# Patient Record
Sex: FEMALE | Race: BLACK OR AFRICAN AMERICAN | NOT HISPANIC OR LATINO | Employment: OTHER | URBAN - METROPOLITAN AREA
[De-identification: names, ages, dates, MRNs, and addresses within clinical notes are randomized per-mention and may not be internally consistent; named-entity substitution may affect disease eponyms.]

---

## 2023-06-29 LAB
CREATININE (MG/DL) IN SER/PLAS: 0.87 MG/DL (ref 0.5–1.05)
GFR FEMALE: 69 ML/MIN/1.73M2

## 2023-08-17 LAB
ALANINE AMINOTRANSFERASE (SGPT) (U/L) IN SER/PLAS: 18 U/L (ref 7–45)
ALBUMIN (G/DL) IN SER/PLAS: 4.2 G/DL (ref 3.4–5)
ALKALINE PHOSPHATASE (U/L) IN SER/PLAS: 66 U/L (ref 33–136)
ANION GAP IN SER/PLAS: 13 MMOL/L (ref 10–20)
ASPARTATE AMINOTRANSFERASE (SGOT) (U/L) IN SER/PLAS: 17 U/L (ref 9–39)
BILIRUBIN TOTAL (MG/DL) IN SER/PLAS: 0.2 MG/DL (ref 0–1.2)
CALCIDIOL (25 OH VITAMIN D3) (NG/ML) IN SER/PLAS: 27 NG/ML
CALCIUM (MG/DL) IN SER/PLAS: 9.2 MG/DL (ref 8.6–10.6)
CARBON DIOXIDE, TOTAL (MMOL/L) IN SER/PLAS: 27 MMOL/L (ref 21–32)
CHLORIDE (MMOL/L) IN SER/PLAS: 108 MMOL/L (ref 98–107)
CREATININE (MG/DL) IN SER/PLAS: 0.95 MG/DL (ref 0.5–1.05)
ERYTHROCYTE DISTRIBUTION WIDTH (RATIO) BY AUTOMATED COUNT: 18.8 % (ref 11.5–14.5)
ERYTHROCYTE MEAN CORPUSCULAR HEMOGLOBIN CONCENTRATION (G/DL) BY AUTOMATED: 28.8 G/DL (ref 32–36)
ERYTHROCYTE MEAN CORPUSCULAR VOLUME (FL) BY AUTOMATED COUNT: 77 FL (ref 80–100)
ERYTHROCYTES (10*6/UL) IN BLOOD BY AUTOMATED COUNT: 4.44 X10E12/L (ref 4–5.2)
ESTIMATED AVERAGE GLUCOSE FOR HBA1C: 117 MG/DL
GFR FEMALE: 62 ML/MIN/1.73M2
GLUCOSE (MG/DL) IN SER/PLAS: 85 MG/DL (ref 74–99)
HEMATOCRIT (%) IN BLOOD BY AUTOMATED COUNT: 34 % (ref 36–46)
HEMOGLOBIN (G/DL) IN BLOOD: 9.8 G/DL (ref 12–16)
HEMOGLOBIN A1C/HEMOGLOBIN TOTAL IN BLOOD: 5.7 %
LEUKOCYTES (10*3/UL) IN BLOOD BY AUTOMATED COUNT: 4.7 X10E9/L (ref 4.4–11.3)
NRBC (PER 100 WBCS) BY AUTOMATED COUNT: 0 /100 WBC (ref 0–0)
PLATELETS (10*3/UL) IN BLOOD AUTOMATED COUNT: 275 X10E9/L (ref 150–450)
POTASSIUM (MMOL/L) IN SER/PLAS: 5 MMOL/L (ref 3.5–5.3)
PROTEIN TOTAL: 7.1 G/DL (ref 6.4–8.2)
SODIUM (MMOL/L) IN SER/PLAS: 143 MMOL/L (ref 136–145)
THYROTROPIN (MIU/L) IN SER/PLAS BY DETECTION LIMIT <= 0.05 MIU/L: 1.74 MIU/L (ref 0.44–3.98)
UREA NITROGEN (MG/DL) IN SER/PLAS: 23 MG/DL (ref 6–23)

## 2023-12-07 ENCOUNTER — HOSPITAL ENCOUNTER (OUTPATIENT)
Dept: RADIOLOGY | Facility: HOSPITAL | Age: 78
Discharge: HOME | End: 2023-12-07
Payer: COMMERCIAL

## 2023-12-07 DIAGNOSIS — M81.0 AGE-RELATED OSTEOPOROSIS WITHOUT CURRENT PATHOLOGICAL FRACTURE: ICD-10-CM

## 2023-12-07 PROCEDURE — 77080 DXA BONE DENSITY AXIAL: CPT

## 2023-12-07 PROCEDURE — 77080 DXA BONE DENSITY AXIAL: CPT | Performed by: RADIOLOGY

## 2024-02-08 ENCOUNTER — HOSPITAL ENCOUNTER (OUTPATIENT)
Facility: HOSPITAL | Age: 79
Setting detail: OBSERVATION
Discharge: HOME | End: 2024-02-09
Attending: STUDENT IN AN ORGANIZED HEALTH CARE EDUCATION/TRAINING PROGRAM
Payer: COMMERCIAL

## 2024-02-08 ENCOUNTER — APPOINTMENT (OUTPATIENT)
Dept: RADIOLOGY | Facility: HOSPITAL | Age: 79
End: 2024-02-08
Payer: COMMERCIAL

## 2024-02-08 ENCOUNTER — CLINICAL SUPPORT (OUTPATIENT)
Dept: EMERGENCY MEDICINE | Facility: HOSPITAL | Age: 79
End: 2024-02-08
Payer: COMMERCIAL

## 2024-02-08 DIAGNOSIS — R07.9 CHEST PAIN, UNSPECIFIED TYPE: Primary | ICD-10-CM

## 2024-02-08 LAB
ALBUMIN SERPL BCP-MCNC: 4 G/DL (ref 3.4–5)
ALP SERPL-CCNC: 66 U/L (ref 33–136)
ALT SERPL W P-5'-P-CCNC: 14 U/L (ref 7–45)
ANION GAP SERPL CALC-SCNC: 12 MMOL/L (ref 10–20)
AST SERPL W P-5'-P-CCNC: 19 U/L (ref 9–39)
ATRIAL RATE: 84 BPM
BASOPHILS # BLD AUTO: 0.02 X10*3/UL (ref 0–0.1)
BASOPHILS NFR BLD AUTO: 0.5 %
BILIRUB SERPL-MCNC: 0.2 MG/DL (ref 0–1.2)
BUN SERPL-MCNC: 12 MG/DL (ref 6–23)
CALCIUM SERPL-MCNC: 9.5 MG/DL (ref 8.6–10.6)
CARDIAC TROPONIN I PNL SERPL HS: 3 NG/L (ref 0–34)
CARDIAC TROPONIN I PNL SERPL HS: 5 NG/L (ref 0–34)
CHLORIDE SERPL-SCNC: 107 MMOL/L (ref 98–107)
CO2 SERPL-SCNC: 26 MMOL/L (ref 21–32)
CREAT SERPL-MCNC: 0.86 MG/DL (ref 0.5–1.05)
EGFRCR SERPLBLD CKD-EPI 2021: 70 ML/MIN/1.73M*2
EOSINOPHIL # BLD AUTO: 0.05 X10*3/UL (ref 0–0.4)
EOSINOPHIL NFR BLD AUTO: 1.2 %
ERYTHROCYTE [DISTWIDTH] IN BLOOD BY AUTOMATED COUNT: 18.4 % (ref 11.5–14.5)
FLUAV RNA RESP QL NAA+PROBE: NOT DETECTED
FLUBV RNA RESP QL NAA+PROBE: NOT DETECTED
GLUCOSE SERPL-MCNC: 58 MG/DL (ref 74–99)
HCT VFR BLD AUTO: 31.5 % (ref 36–46)
HGB BLD-MCNC: 9.6 G/DL (ref 12–16)
IMM GRANULOCYTES # BLD AUTO: 0.01 X10*3/UL (ref 0–0.5)
IMM GRANULOCYTES NFR BLD AUTO: 0.2 % (ref 0–0.9)
LYMPHOCYTES # BLD AUTO: 1.78 X10*3/UL (ref 0.8–3)
LYMPHOCYTES NFR BLD AUTO: 41.6 %
MCH RBC QN AUTO: 22 PG (ref 26–34)
MCHC RBC AUTO-ENTMCNC: 30.5 G/DL (ref 32–36)
MCV RBC AUTO: 72 FL (ref 80–100)
MONOCYTES # BLD AUTO: 0.42 X10*3/UL (ref 0.05–0.8)
MONOCYTES NFR BLD AUTO: 9.8 %
NEUTROPHILS # BLD AUTO: 2 X10*3/UL (ref 1.6–5.5)
NEUTROPHILS NFR BLD AUTO: 46.7 %
NRBC BLD-RTO: 0 /100 WBCS (ref 0–0)
P AXIS: 80 DEGREES
P OFFSET: 202 MS
P ONSET: 140 MS
PLATELET # BLD AUTO: 295 X10*3/UL (ref 150–450)
POTASSIUM SERPL-SCNC: 4.2 MMOL/L (ref 3.5–5.3)
PR INTERVAL: 166 MS
PROT SERPL-MCNC: 7.7 G/DL (ref 6.4–8.2)
Q ONSET: 223 MS
QRS COUNT: 14 BEATS
QRS DURATION: 82 MS
QT INTERVAL: 350 MS
QTC CALCULATION(BAZETT): 413 MS
QTC FREDERICIA: 391 MS
R AXIS: 72 DEGREES
RBC # BLD AUTO: 4.37 X10*6/UL (ref 4–5.2)
SARS-COV-2 RNA RESP QL NAA+PROBE: NOT DETECTED
SODIUM SERPL-SCNC: 141 MMOL/L (ref 136–145)
T AXIS: 60 DEGREES
T OFFSET: 398 MS
VENTRICULAR RATE: 84 BPM
WBC # BLD AUTO: 4.3 X10*3/UL (ref 4.4–11.3)

## 2024-02-08 PROCEDURE — 84484 ASSAY OF TROPONIN QUANT: CPT | Performed by: EMERGENCY MEDICINE

## 2024-02-08 PROCEDURE — 71046 X-RAY EXAM CHEST 2 VIEWS: CPT

## 2024-02-08 PROCEDURE — 71275 CT ANGIOGRAPHY CHEST: CPT | Performed by: RADIOLOGY

## 2024-02-08 PROCEDURE — 84484 ASSAY OF TROPONIN QUANT: CPT

## 2024-02-08 PROCEDURE — 2550000001 HC RX 255 CONTRASTS: Mod: SE

## 2024-02-08 PROCEDURE — 71045 X-RAY EXAM CHEST 1 VIEW: CPT | Performed by: RADIOLOGY

## 2024-02-08 PROCEDURE — 93005 ELECTROCARDIOGRAM TRACING: CPT | Mod: 59

## 2024-02-08 PROCEDURE — G0378 HOSPITAL OBSERVATION PER HR: HCPCS

## 2024-02-08 PROCEDURE — 80053 COMPREHEN METABOLIC PANEL: CPT | Performed by: STUDENT IN AN ORGANIZED HEALTH CARE EDUCATION/TRAINING PROGRAM

## 2024-02-08 PROCEDURE — 85025 COMPLETE CBC W/AUTO DIFF WBC: CPT | Performed by: EMERGENCY MEDICINE

## 2024-02-08 PROCEDURE — 96374 THER/PROPH/DIAG INJ IV PUSH: CPT | Mod: 59

## 2024-02-08 PROCEDURE — 80053 COMPREHEN METABOLIC PANEL: CPT | Performed by: EMERGENCY MEDICINE

## 2024-02-08 PROCEDURE — 36415 COLL VENOUS BLD VENIPUNCTURE: CPT

## 2024-02-08 PROCEDURE — 85025 COMPLETE CBC W/AUTO DIFF WBC: CPT | Performed by: STUDENT IN AN ORGANIZED HEALTH CARE EDUCATION/TRAINING PROGRAM

## 2024-02-08 PROCEDURE — 96375 TX/PRO/DX INJ NEW DRUG ADDON: CPT | Mod: 59

## 2024-02-08 PROCEDURE — 2500000004 HC RX 250 GENERAL PHARMACY W/ HCPCS (ALT 636 FOR OP/ED): Mod: SE

## 2024-02-08 PROCEDURE — 36415 COLL VENOUS BLD VENIPUNCTURE: CPT | Performed by: EMERGENCY MEDICINE

## 2024-02-08 PROCEDURE — 87636 SARSCOV2 & INF A&B AMP PRB: CPT

## 2024-02-08 PROCEDURE — 99285 EMERGENCY DEPT VISIT HI MDM: CPT | Performed by: STUDENT IN AN ORGANIZED HEALTH CARE EDUCATION/TRAINING PROGRAM

## 2024-02-08 PROCEDURE — 71275 CT ANGIOGRAPHY CHEST: CPT

## 2024-02-08 PROCEDURE — 99285 EMERGENCY DEPT VISIT HI MDM: CPT | Mod: 25 | Performed by: STUDENT IN AN ORGANIZED HEALTH CARE EDUCATION/TRAINING PROGRAM

## 2024-02-08 PROCEDURE — 84484 ASSAY OF TROPONIN QUANT: CPT | Performed by: STUDENT IN AN ORGANIZED HEALTH CARE EDUCATION/TRAINING PROGRAM

## 2024-02-08 RX ORDER — KETOROLAC TROMETHAMINE 15 MG/ML
15 INJECTION, SOLUTION INTRAMUSCULAR; INTRAVENOUS ONCE
Status: COMPLETED | OUTPATIENT
Start: 2024-02-08 | End: 2024-02-08

## 2024-02-08 RX ORDER — FAMOTIDINE 10 MG/ML
40 INJECTION INTRAVENOUS ONCE
Status: COMPLETED | OUTPATIENT
Start: 2024-02-08 | End: 2024-02-08

## 2024-02-08 RX ADMIN — IOHEXOL 80 ML: 350 INJECTION, SOLUTION INTRAVENOUS at 20:27

## 2024-02-08 RX ADMIN — KETOROLAC TROMETHAMINE 15 MG: 15 INJECTION, SOLUTION INTRAMUSCULAR; INTRAVENOUS at 17:11

## 2024-02-08 RX ADMIN — FAMOTIDINE 40 MG: 10 INJECTION INTRAVENOUS at 20:44

## 2024-02-08 ASSESSMENT — PAIN - FUNCTIONAL ASSESSMENT
PAIN_FUNCTIONAL_ASSESSMENT: WONG-BAKER FACES
PAIN_FUNCTIONAL_ASSESSMENT: 0-10

## 2024-02-08 ASSESSMENT — PAIN SCALES - WONG BAKER: WONGBAKER_NUMERICALRESPONSE: HURTS LITTLE MORE

## 2024-02-08 ASSESSMENT — COLUMBIA-SUICIDE SEVERITY RATING SCALE - C-SSRS
1. IN THE PAST MONTH, HAVE YOU WISHED YOU WERE DEAD OR WISHED YOU COULD GO TO SLEEP AND NOT WAKE UP?: NO
2. HAVE YOU ACTUALLY HAD ANY THOUGHTS OF KILLING YOURSELF?: NO
6. HAVE YOU EVER DONE ANYTHING, STARTED TO DO ANYTHING, OR PREPARED TO DO ANYTHING TO END YOUR LIFE?: NO

## 2024-02-08 ASSESSMENT — PAIN SCALES - GENERAL: PAINLEVEL_OUTOF10: 5 - MODERATE PAIN

## 2024-02-08 ASSESSMENT — PAIN DESCRIPTION - DESCRIPTORS: DESCRIPTORS: PRESSURE

## 2024-02-08 ASSESSMENT — PAIN DESCRIPTION - LOCATION: LOCATION: BACK

## 2024-02-08 NOTE — ED TRIAGE NOTES
"Chest pressure starting between 9-10 am today and also states \"It feels like a pulled a muscle in my back\". Pt is noting fatigue. Denies cough. Pt is noting shortness of breath worsening with ambulation. Pt speaking clear full sentences, A&O x4. Ambulatory with walker in triage  "

## 2024-02-09 ENCOUNTER — APPOINTMENT (OUTPATIENT)
Dept: CARDIOLOGY | Facility: HOSPITAL | Age: 79
End: 2024-02-09
Payer: COMMERCIAL

## 2024-02-09 ENCOUNTER — APPOINTMENT (OUTPATIENT)
Dept: RADIOLOGY | Facility: HOSPITAL | Age: 79
End: 2024-02-09
Payer: COMMERCIAL

## 2024-02-09 VITALS
DIASTOLIC BLOOD PRESSURE: 72 MMHG | TEMPERATURE: 98.2 F | BODY MASS INDEX: 35.85 KG/M2 | WEIGHT: 210 LBS | OXYGEN SATURATION: 98 % | HEIGHT: 64 IN | SYSTOLIC BLOOD PRESSURE: 149 MMHG | HEART RATE: 81 BPM | RESPIRATION RATE: 20 BRPM

## 2024-02-09 LAB
ANION GAP SERPL CALC-SCNC: 13 MMOL/L (ref 10–20)
BUN SERPL-MCNC: 12 MG/DL (ref 6–23)
CALCIUM SERPL-MCNC: 8.6 MG/DL (ref 8.6–10.6)
CARDIAC TROPONIN I PNL SERPL HS: 5 NG/L (ref 0–34)
CHLORIDE SERPL-SCNC: 106 MMOL/L (ref 98–107)
CO2 SERPL-SCNC: 26 MMOL/L (ref 21–32)
CREAT SERPL-MCNC: 0.79 MG/DL (ref 0.5–1.05)
EGFRCR SERPLBLD CKD-EPI 2021: 77 ML/MIN/1.73M*2
ERYTHROCYTE [DISTWIDTH] IN BLOOD BY AUTOMATED COUNT: 18.1 % (ref 11.5–14.5)
GLUCOSE BLD MANUAL STRIP-MCNC: 95 MG/DL (ref 74–99)
GLUCOSE SERPL-MCNC: 84 MG/DL (ref 74–99)
HCT VFR BLD AUTO: 29.8 % (ref 36–46)
HGB BLD-MCNC: 9.2 G/DL (ref 12–16)
HOLD SPECIMEN: NORMAL
MCH RBC QN AUTO: 22.1 PG (ref 26–34)
MCHC RBC AUTO-ENTMCNC: 30.9 G/DL (ref 32–36)
MCV RBC AUTO: 72 FL (ref 80–100)
NRBC BLD-RTO: 0 /100 WBCS (ref 0–0)
PLATELET # BLD AUTO: 292 X10*3/UL (ref 150–450)
POTASSIUM SERPL-SCNC: 4.5 MMOL/L (ref 3.5–5.3)
RBC # BLD AUTO: 4.17 X10*6/UL (ref 4–5.2)
SODIUM SERPL-SCNC: 140 MMOL/L (ref 136–145)
WBC # BLD AUTO: 3.6 X10*3/UL (ref 4.4–11.3)

## 2024-02-09 PROCEDURE — 84484 ASSAY OF TROPONIN QUANT: CPT | Performed by: PHYSICIAN ASSISTANT

## 2024-02-09 PROCEDURE — 75574 CT ANGIO HRT W/3D IMAGE: CPT | Performed by: RADIOLOGY

## 2024-02-09 PROCEDURE — 82947 ASSAY GLUCOSE BLOOD QUANT: CPT | Mod: 59

## 2024-02-09 PROCEDURE — 2500000005 HC RX 250 GENERAL PHARMACY W/O HCPCS: Mod: SE

## 2024-02-09 PROCEDURE — 36415 COLL VENOUS BLD VENIPUNCTURE: CPT

## 2024-02-09 PROCEDURE — 36415 COLL VENOUS BLD VENIPUNCTURE: CPT | Performed by: PHYSICIAN ASSISTANT

## 2024-02-09 PROCEDURE — 2500000004 HC RX 250 GENERAL PHARMACY W/ HCPCS (ALT 636 FOR OP/ED): Mod: SE

## 2024-02-09 PROCEDURE — 93005 ELECTROCARDIOGRAM TRACING: CPT | Mod: 59

## 2024-02-09 PROCEDURE — 2500000001 HC RX 250 WO HCPCS SELF ADMINISTERED DRUGS (ALT 637 FOR MEDICARE OP): Mod: SE

## 2024-02-09 PROCEDURE — 85027 COMPLETE CBC AUTOMATED: CPT

## 2024-02-09 PROCEDURE — 96375 TX/PRO/DX INJ NEW DRUG ADDON: CPT | Mod: 59

## 2024-02-09 PROCEDURE — 2550000001 HC RX 255 CONTRASTS: Mod: SE

## 2024-02-09 PROCEDURE — 80048 BASIC METABOLIC PNL TOTAL CA: CPT

## 2024-02-09 PROCEDURE — 93010 ELECTROCARDIOGRAM REPORT: CPT | Performed by: INTERNAL MEDICINE

## 2024-02-09 PROCEDURE — 75574 CT ANGIO HRT W/3D IMAGE: CPT

## 2024-02-09 PROCEDURE — G0378 HOSPITAL OBSERVATION PER HR: HCPCS

## 2024-02-09 RX ORDER — ALBUTEROL SULFATE 0.83 MG/ML
3 SOLUTION RESPIRATORY (INHALATION) EVERY 6 HOURS PRN
Status: DISCONTINUED | OUTPATIENT
Start: 2024-02-09 | End: 2024-02-09 | Stop reason: HOSPADM

## 2024-02-09 RX ORDER — LORAZEPAM 2 MG/ML
0.5 INJECTION INTRAMUSCULAR EVERY 5 MIN PRN
Status: DISCONTINUED | OUTPATIENT
Start: 2024-02-09 | End: 2024-02-09

## 2024-02-09 RX ORDER — METOPROLOL TARTRATE 100 MG/1
100 TABLET ORAL ONCE
Status: COMPLETED | OUTPATIENT
Start: 2024-02-09 | End: 2024-02-09

## 2024-02-09 RX ORDER — ALBUTEROL SULFATE 90 UG/1
AEROSOL, METERED RESPIRATORY (INHALATION) EVERY 6 HOURS PRN
COMMUNITY
Start: 2013-05-04

## 2024-02-09 RX ORDER — ONDANSETRON HYDROCHLORIDE 2 MG/ML
4 INJECTION, SOLUTION INTRAVENOUS ONCE
Status: COMPLETED | OUTPATIENT
Start: 2024-02-09 | End: 2024-02-09

## 2024-02-09 RX ORDER — METOPROLOL TARTRATE 1 MG/ML
5 INJECTION, SOLUTION INTRAVENOUS ONCE AS NEEDED
Status: COMPLETED | OUTPATIENT
Start: 2024-02-09 | End: 2024-02-09

## 2024-02-09 RX ORDER — ONDANSETRON HYDROCHLORIDE 2 MG/ML
INJECTION, SOLUTION INTRAVENOUS
Status: COMPLETED
Start: 2024-02-09 | End: 2024-02-09

## 2024-02-09 RX ORDER — METOPROLOL TARTRATE 1 MG/ML
5 INJECTION, SOLUTION INTRAVENOUS ONCE
Status: COMPLETED | OUTPATIENT
Start: 2024-02-09 | End: 2024-02-09

## 2024-02-09 RX ORDER — DEXTROSE MONOHYDRATE 100 MG/ML
0.3 INJECTION, SOLUTION INTRAVENOUS ONCE AS NEEDED
Status: DISCONTINUED | OUTPATIENT
Start: 2024-02-09 | End: 2024-02-09 | Stop reason: HOSPADM

## 2024-02-09 RX ORDER — GABAPENTIN 100 MG/1
100 CAPSULE ORAL 3 TIMES DAILY
Status: DISCONTINUED | OUTPATIENT
Start: 2024-02-09 | End: 2024-02-09 | Stop reason: HOSPADM

## 2024-02-09 RX ORDER — LOSARTAN POTASSIUM 25 MG/1
25 TABLET ORAL DAILY
Status: DISCONTINUED | OUTPATIENT
Start: 2024-02-09 | End: 2024-02-09 | Stop reason: HOSPADM

## 2024-02-09 RX ORDER — METOPROLOL TARTRATE 100 MG/1
100 TABLET ORAL ONCE AS NEEDED
Status: COMPLETED | OUTPATIENT
Start: 2024-02-09 | End: 2024-02-09

## 2024-02-09 RX ORDER — LOSARTAN POTASSIUM 25 MG/1
1 TABLET ORAL DAILY
COMMUNITY
Start: 2022-08-11 | End: 2024-02-15 | Stop reason: SDUPTHER

## 2024-02-09 RX ORDER — GABAPENTIN 100 MG/1
100 CAPSULE ORAL 3 TIMES DAILY
COMMUNITY
Start: 2023-09-12

## 2024-02-09 RX ORDER — DEXTROSE 50 % IN WATER (D50W) INTRAVENOUS SYRINGE
25
Status: DISCONTINUED | OUTPATIENT
Start: 2024-02-09 | End: 2024-02-09 | Stop reason: HOSPADM

## 2024-02-09 RX ORDER — NITROGLYCERIN 0.4 MG/1
0.8 TABLET SUBLINGUAL ONCE
Status: COMPLETED | OUTPATIENT
Start: 2024-02-09 | End: 2024-02-09

## 2024-02-09 RX ORDER — ALBUTEROL SULFATE 0.83 MG/ML
3 SOLUTION RESPIRATORY (INHALATION) EVERY 6 HOURS PRN
COMMUNITY
Start: 2015-04-03

## 2024-02-09 RX ORDER — INSULIN LISPRO 100 [IU]/ML
10 INJECTION, SOLUTION INTRAVENOUS; SUBCUTANEOUS
Status: DISCONTINUED | OUTPATIENT
Start: 2024-02-09 | End: 2024-02-09 | Stop reason: HOSPADM

## 2024-02-09 RX ADMIN — METOPROLOL TARTRATE 100 MG: 100 TABLET, FILM COATED ORAL at 06:18

## 2024-02-09 RX ADMIN — METOPROLOL TARTRATE 5 MG: 1 INJECTION, SOLUTION INTRAVENOUS at 04:53

## 2024-02-09 RX ADMIN — METOPROLOL TARTRATE 5 MG: 5 INJECTION, SOLUTION INTRAVENOUS at 08:07

## 2024-02-09 RX ADMIN — METOPROLOL TARTRATE 5 MG: 5 INJECTION, SOLUTION INTRAVENOUS at 08:41

## 2024-02-09 RX ADMIN — METOPROLOL TARTRATE 5 MG: 5 INJECTION, SOLUTION INTRAVENOUS at 08:45

## 2024-02-09 RX ADMIN — METOPROLOL TARTRATE 5 MG: 1 INJECTION, SOLUTION INTRAVENOUS at 08:12

## 2024-02-09 RX ADMIN — NITROGLYCERIN 0.8 MG: 0.4 TABLET SUBLINGUAL at 08:47

## 2024-02-09 RX ADMIN — ONDANSETRON 4 MG: 2 INJECTION INTRAMUSCULAR; INTRAVENOUS at 06:22

## 2024-02-09 RX ADMIN — GABAPENTIN 100 MG: 100 CAPSULE ORAL at 08:11

## 2024-02-09 RX ADMIN — IOHEXOL 90 ML: 350 INJECTION, SOLUTION INTRAVENOUS at 09:13

## 2024-02-09 RX ADMIN — LOSARTAN POTASSIUM 25 MG: 25 TABLET, FILM COATED ORAL at 08:12

## 2024-02-09 RX ADMIN — METOPROLOL TARTRATE 100 MG: 100 TABLET, FILM COATED ORAL at 06:21

## 2024-02-09 ASSESSMENT — PAIN SCALES - WONG BAKER: WONGBAKER_NUMERICALRESPONSE: NO HURT

## 2024-02-09 ASSESSMENT — PAIN - FUNCTIONAL ASSESSMENT
PAIN_FUNCTIONAL_ASSESSMENT: 0-10
PAIN_FUNCTIONAL_ASSESSMENT: 0-10

## 2024-02-09 ASSESSMENT — PAIN SCALES - GENERAL
PAINLEVEL_OUTOF10: 0 - NO PAIN
PAINLEVEL_OUTOF10: 0 - NO PAIN

## 2024-02-09 NOTE — ED PROVIDER NOTES
Disposition Note  [facility] CLINICAL DECISION  Patient: Myah Sutherland  MRN: [id number]  : 1945  Date of Evaluation: 2024  ED Provider: Marek Roche PA-C      Limitations to history: None  Independent Historian: Yes  External Records Reviewed: Relevant and recent inpatient outpatient notes in EMR      Subjective:    Myah Sutherland is a 78 y.o. female has undergone comprehensive diagnostic evaluation and therapeutic management in accordance with the CDU guidelines for Chest pain. Based on Mrs. Sutherland clinical response and diagnostic information during this period of observation, it has been determined that the patient will be discharged.    The acute evaluation included:  Orders Placed This Encounter   Procedures    XR chest 2 views    CT angio chest for pulmonary embolism    CT angio coronary art with heartflow if score >30%    CBC with Differential    Comprehensive Metabolic Panel    Troponin I, High Sensitivity    Troponin I, High Sensitivity    Sars-CoV-2 and Influenza A/B PCR    Basic metabolic panel    CBC    Extra Tubes    Light Blue Top    Lavender Top    SST TOP    Anthony Top    PST Top    Troponin I, High Sensitivity    Extra Tubes    Light Blue Top    Green Top    Lavender Top    Adult diet 2-3 grams sodium    Communication - NIPP Chest Pain    Communication - Print out prior EKG for comparison    Weight on admission    Height on admission    Activity (specify) No Restrictions    Vital Signs    Vital Signs    Notify provider    Nursing communication for Metoprolol/Labetalol dosing    Telemetry monitoring    Full code    POCT GLUCOSE    ECG 12 lead    Electrocardiogram, 12-lead PRN ACS symptoms    Insert and maintain peripheral IV    Send to CDU    Initiate observation status         Placed in observation at: 2354       Past History     Past Medical History:   Diagnosis Date    Other conditions influencing health status     Uterine Cancer    Personal history of colonic polyps  09/14/2017    History of colon polyps    Personal history of diseases of the blood and blood-forming organs and certain disorders involving the immune mechanism 09/08/2017    History of anemia    Personal history of other diseases of the circulatory system 04/28/2016    History of hypertension     Past Surgical History:   Procedure Laterality Date    CHOLECYSTECTOMY  11/20/2013    Cholecystectomy    GASTRIC RESTRICTION SURGERY  11/20/2013    Gastric Surgery For Morbid Obesity    HYSTERECTOMY  11/20/2013    Hysterectomy    OTHER SURGICAL HISTORY  12/11/2015    Central IV Line With Subcutaneous [place] Mediport     Social History     Socioeconomic History    Marital status:      Spouse name: Not on file    Number of children: Not on file    Years of education: Not on file    Highest education level: Not on file   Occupational History    Not on file   Tobacco Use    Smoking status: Not on file    Smokeless tobacco: Not on file   Substance and Sexual Activity    Alcohol use: Not on file    Drug use: Not on file    Sexual activity: Not on file   Other Topics Concern    Not on file   Social History Narrative    Not on file     Social Determinants of Health     Financial Resource Strain: Not on file   Food Insecurity: Not on file   Transportation Needs: Not on file   Physical Activity: Not on file   Stress: Not on file   Social Connections: Not on file   Intimate Partner Violence: Not on file   Housing Stability: Not on file         Medications/Allergies     Previous Medications    ALBUTEROL 2.5 MG /3 ML (0.083 %) NEBULIZER SOLUTION    Inhale 3 mL every 6 hours if needed.    ALBUTEROL 90 MCG/ACTUATION INHALER    Inhale every 6 hours if needed.    GABAPENTIN (NEURONTIN) 100 MG CAPSULE    Take 1 capsule (100 mg) by mouth 3 times a day.    LOSARTAN (COZAAR) 25 MG TABLET    Take 1 tablet (25 mg) by mouth once daily.     Allergies   Allergen Reactions    Penicillin G Anaphylaxis         Review of Systems  All systems  reviewed and otherwise negative, except as stated above in HPI.    Diagnostics reviewed by Marek Roche PA-C     Labs:  Results for orders placed or performed during the hospital encounter of 02/08/24   CBC with Differential   Result Value Ref Range    WBC 4.3 (L) 4.4 - 11.3 x10*3/uL    nRBC 0.0 0.0 - 0.0 /100 WBCs    RBC 4.37 4.00 - 5.20 x10*6/uL    Hemoglobin 9.6 (L) 12.0 - 16.0 g/dL    Hematocrit 31.5 (L) 36.0 - 46.0 %    MCV 72 (L) 80 - 100 fL    MCH 22.0 (L) 26.0 - 34.0 pg    MCHC 30.5 (L) 32.0 - 36.0 g/dL    RDW 18.4 (H) 11.5 - 14.5 %    Platelets 295 150 - 450 x10*3/uL    Neutrophils % 46.7 40.0 - 80.0 %    Immature Granulocytes %, Automated 0.2 0.0 - 0.9 %    Lymphocytes % 41.6 13.0 - 44.0 %    Monocytes % 9.8 2.0 - 10.0 %    Eosinophils % 1.2 0.0 - 6.0 %    Basophils % 0.5 0.0 - 2.0 %    Neutrophils Absolute 2.00 1.60 - 5.50 x10*3/uL    Immature Granulocytes Absolute, Automated 0.01 0.00 - 0.50 x10*3/uL    Lymphocytes Absolute 1.78 0.80 - 3.00 x10*3/uL    Monocytes Absolute 0.42 0.05 - 0.80 x10*3/uL    Eosinophils Absolute 0.05 0.00 - 0.40 x10*3/uL    Basophils Absolute 0.02 0.00 - 0.10 x10*3/uL   Comprehensive Metabolic Panel   Result Value Ref Range    Glucose 58 (L) 74 - 99 mg/dL    Sodium 141 136 - 145 mmol/L    Potassium 4.2 3.5 - 5.3 mmol/L    Chloride 107 98 - 107 mmol/L    Bicarbonate 26 21 - 32 mmol/L    Anion Gap 12 10 - 20 mmol/L    Urea Nitrogen 12 6 - 23 mg/dL    Creatinine 0.86 0.50 - 1.05 mg/dL    eGFR 70 >60 mL/min/1.73m*2    Calcium 9.5 8.6 - 10.6 mg/dL    Albumin 4.0 3.4 - 5.0 g/dL    Alkaline Phosphatase 66 33 - 136 U/L    Total Protein 7.7 6.4 - 8.2 g/dL    AST 19 9 - 39 U/L    Bilirubin, Total 0.2 0.0 - 1.2 mg/dL    ALT 14 7 - 45 U/L   Troponin I, High Sensitivity   Result Value Ref Range    Troponin I, High Sensitivity 3 0 - 34 ng/L   Troponin I, High Sensitivity   Result Value Ref Range    Troponin I, High Sensitivity 5 0 - 34 ng/L   Sars-CoV-2 and Influenza A/B PCR   Result Value  Ref Range    Flu A Result Not Detected Not Detected    Flu B Result Not Detected Not Detected    Coronavirus 2019, PCR Not Detected Not Detected   Basic metabolic panel   Result Value Ref Range    Glucose 84 74 - 99 mg/dL    Sodium 140 136 - 145 mmol/L    Potassium 4.5 3.5 - 5.3 mmol/L    Chloride 106 98 - 107 mmol/L    Bicarbonate 26 21 - 32 mmol/L    Anion Gap 13 10 - 20 mmol/L    Urea Nitrogen 12 6 - 23 mg/dL    Creatinine 0.79 0.50 - 1.05 mg/dL    eGFR 77 >60 mL/min/1.73m*2    Calcium 8.6 8.6 - 10.6 mg/dL   CBC   Result Value Ref Range    WBC 3.6 (L) 4.4 - 11.3 x10*3/uL    nRBC 0.0 0.0 - 0.0 /100 WBCs    RBC 4.17 4.00 - 5.20 x10*6/uL    Hemoglobin 9.2 (L) 12.0 - 16.0 g/dL    Hematocrit 29.8 (L) 36.0 - 46.0 %    MCV 72 (L) 80 - 100 fL    MCH 22.1 (L) 26.0 - 34.0 pg    MCHC 30.9 (L) 32.0 - 36.0 g/dL    RDW 18.1 (H) 11.5 - 14.5 %    Platelets 292 150 - 450 x10*3/uL   Light Blue Top   Result Value Ref Range    Extra Tube Hold for add-ons.    Lavender Top   Result Value Ref Range    Extra Tube Hold for add-ons.    SST TOP   Result Value Ref Range    Extra Tube Hold for add-ons.    Gray Top   Result Value Ref Range    Extra Tube Hold for add-ons.    PST Top   Result Value Ref Range    Extra Tube Hold for add-ons.    Troponin I, High Sensitivity   Result Value Ref Range    Troponin I, High Sensitivity 5 0 - 34 ng/L   Light Blue Top   Result Value Ref Range    Extra Tube Hold for add-ons.    Green Top   Result Value Ref Range    Extra Tube Hold for add-ons.    Lavender Top   Result Value Ref Range    Extra Tube Hold for add-ons.    POCT GLUCOSE   Result Value Ref Range    POCT Glucose 95 74 - 99 mg/dL   ECG 12 lead   Result Value Ref Range    Ventricular Rate 84 BPM    Atrial Rate 84 BPM    VT Interval 166 ms    QRS Duration 82 ms    QT Interval 350 ms    QTC Calculation(Bazett) 413 ms    P Axis 80 degrees    R Axis 72 degrees    T Axis 60 degrees    QRS Count 14 beats    Q Onset 223 ms    P Onset 140 ms    P Offset 202  ms    T Offset 398 ms    QTC Fredericia 391 ms   Electrocardiogram, 12-lead PRN ACS symptoms   Result Value Ref Range    Ventricular Rate 63 BPM    Atrial Rate 63 BPM    KY Interval 196 ms    QRS Duration 90 ms    QT Interval 398 ms    QTC Calculation(Bazett) 407 ms    P Axis 60 degrees    R Axis 58 degrees    T Axis 26 degrees    QRS Count 11 beats    Q Onset 221 ms    P Onset 123 ms    P Offset 185 ms    T Offset 420 ms    QTC Fredericia 404 ms     Radiographs:  CT angio coronary art with heartflow if score >30%   Final Result   1. Normal coronary anatomy without evidence of atherosclerotic   changes or stenotic disease. Left dominant system.   2. Coronary artery calcium score is 0.   3. Mildly dilated pulmonary artery, correlate for arterial   hypertension.   4. Minimal aortic valve calcification.   5. Left atrial enlargement.   6. Elevated right hemidiaphragm, correlate with phrenic nerve   dysfunction.        CAD-RADS CATEGORY (Stable chest pain):   CAD-RADS 0 : Absence of CAD (no plaque, 0% stenosis). No further   cardiac work up recommended. Consider non-atherosclerotic causes of   chest pain.        (Kala RC et al. CAD-RADS(TM) Coronary Artery Disease - Reporting and   Data System. An expert consensus document of the Society of   Cardiovascular Computed Tomography (SCCT), the American College of   Radiology (ACR) and the North American Society for Cardiovascular   Imaging (NASCI). Endorsed by the American College of Cardiology. J   Cardiovasc Comput Tomogr. 2016 Jul-Aug;10(4):269-81.)        MODIFIERS:        I personally reviewed the image(s) / study and I agree with the   findings as stated by Magda Luke MD. This study was interpreted at   [facility], [place], [place].        MACRO:   None.        Signed by: Twan Hamilton 2/9/2024 10:50 AM   Dictation workstation:   [id number]      CT angio chest for pulmonary embolism   Final Result   1. No acute pulmonary embolism to the 1st  "subsegmental arterial level.   2. Additional findings as described above.                  I personally reviewed the images/study and I agree with the findings   as stated by Resident Aaron Mahoney MD. This study was interpreted   at [facility], [place], [place].        MACRO:   None        Signed by: Katty Shah 2/8/2024 9:45 PM   Dictation workstation:   [id number]      XR chest 2 views   Final Result   No acute cardiopulmonary process is evident.        MACRO:   None        Signed by: Antelmo Lobato 2/8/2024 4:20 PM   Dictation workstation:   [id number]              Physical Exam     Visit Vitals  /84 (BP Location: Right arm)   Pulse 59   Temp 36.8 °C (98.2 °F) (Oral)   Resp 18   Ht 1.626 m (5' 4\")   Wt 95.3 kg (210 lb)   SpO2 99%   BMI 36.05 kg/m²   BSA 2.07 m²       Physical exam  VS: As documented in the triage note and EMR flowsheet from this visit were reviewed.    General: Patient is AAOx3, appears well developed, well nourished, is a good historian, answers questions appropriately    HEENT: head normocephalic, atraumatic, PERRLA, EOMs intact, oropharynx without erythema or exudate, buccal mucosa intact without lesions, nose is patent bilateral    Neck: supple, full ROM, negative for lymphadenopathy, JVD, thyromegaly, tracheal deviation, nuchal rigidity    Pulmonary: Clear to auscultation bilaterally, No wheezing, rales, or rhonchi, no accessory muscle use, able to speak full clear sentences    Cardiac: Normal rate and rhythm, no murmurs, rubs or gallops    GI: soft, non-tender, non-distended, normoactive bowelsounds in all four quadrants, no masses or organomegaly, no guarding or CVA tenderness noted    Musculoskeletal: full weight bearing, HERMAN, no joint effusions, clubbing or edema noted    Skin: Warm, dry, intact, no lesions or rashes noted, turgor is good.    Neuro: patient follow commands, cranial nerves 2-12 grossly intact, motor strengths 5/5 upper and " lower extremities, sensation are symmetrical. No focal deficits.    Psych: Appropriate mood and affect for situation      Consultants  1) N/A      Impression and Plan    In summary, Myah Sutherland has been cared for according to the standard [facility] Clinical Decision Unit observation protocol for Chest pain. This extended period of observation was specifically required to determine the need for hospitalization. Prior to discharge from observation, the final physical exam is documented above.     Significant events during the course of observation based on the goals of the clinical problem list include:   1) Remained stable  2) Chest pain has resolved    Based on the patient's condition and test results, the patient will be discharged    Total length of observation was 26 hours. Dr. Pleitez is the CDU disposition attending.      Discharge Diagnosis  Chest pain    Issues Requiring Follow-Up  See above    Discharge Meds     Your medication list        ASK your doctor about these medications        Instructions Last Dose Given Next Dose Due   albuterol 90 mcg/actuation inhaler           albuterol 2.5 mg /3 mL (0.083 %) nebulizer solution           gabapentin 100 mg capsule  Commonly known as: Neurontin           losartan 25 mg tablet  Commonly known as: Cozaar                    Test Results Pending At Discharge  Pending Labs       No current pending labs.            Hospital Course   Mrs. Sutherland was admitted to the clinical decision unit for chest pain.  Medical workup included nonischemic twelve-lead EKG without arrhythmias, negative high-sensitivity serum troponins x 2 and CT chest PE protocol was negative for pulmonary embolism.  No significant leukocytosis, electrolyte derangement, anemia or renal function abnormalities.  Negative COVID and influenza PCR.  Chest x-ray without acute cardiopulmonary abnormalities.  CTA coronary arteries was performed and was negative for  atherosclerotic changes or stenotic disease.  Mrs. Sutherland remained clinically, hemodynamically and neurologically intact during her CDU admission.  On my examination this afternoon patient denies chest pain and has no other complaints.  Plan to discharge home with instructions to follow-up with her PCP in the next 2 to 4 weeks.  Patient be instructed to return to the emergency department with any new or worsening symptoms or for any other concerns.  I discussed this plan of care with Mrs. Sutherland and she verbalized understanding and is in agreement and will be discharged in stable condition.    Outpatient Follow-Up  Future Appointments   Date Time Provider Department Center   2/15/2024  8:00 AM CAMRYN Funez [id number] [facility]   2/23/2024  9:00 [place] [facility] [id number] [id number] [facility] [place] [facility]   7/25/2024  2:20 PM CAMRYN Mcmillan [id number] [facility]         SHYANNE Khanna PA-C  02/21/24 0932

## 2024-02-09 NOTE — H&P
History and Physical  [facility] EMERGENCY MEDICINE  Patient: Myah Sutherland  MRN: [id number]  : 1945  Date of Evaluation: 2024  ED Provider: CAMYRN Andrew      Limitations to history: None  Independent Historian: Patient  External Records Reviewed: Yes      Patient History:  Myah Sutherland is a 78 y.o. female with past medical history of GERD, HTN, asthma and HLD, who presents to the clinical decision unit for chest pain.  Patient reports shortness of breath and chest pain when taking a deep breath.  She denies fevers, chills, headache, dizziness, nausea/vomiting or abdominal pain.    The acute medical evaluation in the emergency department included:  -EKG  -CBC shows no leukocytosis, has anemia and no thrombocytopenia  -CMP shows glucose low at 58, no electrolyte derangement or renal or hepatic abnormalities.  -Serial troponins are 3, 5  -Influenza/COVID negative  -CXR shows linear opacities, otherwise no acute cardiopulmonary processes.  -CT angio chest shows no PE  The treatment plan included Toradol and Pepcid  After discussion with the ED provider, a decision was made to admit the patient to the Clinical Decision Unit for chest pain.  Plan is to complete a cardiac workup.      In the emergency department, the acute evaluation included:  Orders Placed This Encounter   Procedures    XR chest 2 views    CT angio chest for pulmonary embolism    CBC with Differential    Comprehensive Metabolic Panel    Troponin I, High Sensitivity    Troponin I, High Sensitivity    Sars-CoV-2 and Influenza A/B PCR    Basic metabolic panel    CBC    NPO Diet; Effective now    Communication - NIPP Chest Pain    Communication - Print out prior EKG for comparison    Weight on admission    Height on admission    Activity (specify) No Restrictions    Vital Signs    Telemetry monitoring    Full code    ECG 12 lead    Electrocardiogram, 12-lead PRN ACS symptoms    Insert and maintain peripheral IV     Send to CDU    Initiate observation status       I reviewed the below labs and imaging as ordered by the ED provider:  CT angio chest for pulmonary embolism   Final Result   1. No acute pulmonary embolism to the 1st subsegmental arterial level.   2. Additional findings as described above.                  I personally reviewed the images/study and I agree with the findings   as stated by Resident Aaron Mahoney MD. This study was interpreted   at [facility], [place], [place].        MACRO:   None        Signed by: Katty Shah 2/8/2024 9:45 PM   Dictation workstation:   [id number]      XR chest 2 views   Final Result   No acute cardiopulmonary process is evident.        MACRO:   None        Signed by: Antelmo Lobato 2/8/2024 4:20 PM   Dictation workstation:   [id number]          Labs Reviewed   CBC WITH AUTO DIFFERENTIAL - Abnormal       Result Value    WBC 4.3 (*)     nRBC 0.0      RBC 4.37      Hemoglobin 9.6 (*)     Hematocrit 31.5 (*)     MCV 72 (*)     MCH 22.0 (*)     MCHC 30.5 (*)     RDW 18.4 (*)     Platelets 295      Neutrophils % 46.7      Immature Granulocytes %, Automated 0.2      Lymphocytes % 41.6      Monocytes % 9.8      Eosinophils % 1.2      Basophils % 0.5      Neutrophils Absolute 2.00      Immature Granulocytes Absolute, Automated 0.01      Lymphocytes Absolute 1.78      Monocytes Absolute 0.42      Eosinophils Absolute 0.05      Basophils Absolute 0.02     COMPREHENSIVE METABOLIC PANEL - Abnormal    Glucose 58 (*)     Sodium 141      Potassium 4.2      Chloride 107      Bicarbonate 26      Anion Gap 12      Urea Nitrogen 12      Creatinine 0.86      eGFR 70      Calcium 9.5      Albumin 4.0      Alkaline Phosphatase 66      Total Protein 7.7      AST 19      Bilirubin, Total 0.2      ALT 14     TROPONIN I, HIGH SENSITIVITY - Normal    Troponin I, High Sensitivity 3      Narrative:     Less than 99th percentile of normal range cutoff-  Female and children  under 18 years old <35 ng/L; Male <54 ng/L: Negative  Repeat testing should be performed if clinically indicated.     Female and children under 18 years old  ng/L; Male  ng/L:  Consistent with possible cardiac damage and possible increased clinical   risk. Serial measurements may help to assess extent of myocardial damage.     >120 ng/L: Consistent with cardiac damage, increased clinical risk and  myocardial infarction. Serial measurements may help assess extent of   myocardial damage.      NOTE: Children less than 1 year old may have higher baseline troponin   levels and results should be interpreted in conjunction with the overall   clinical context.    NOTE: Troponin I testing is performed using a different   testing methodology at [facility] than at other   [facility]. Direct result comparisons should only   be made within the same method.     TROPONIN I, HIGH SENSITIVITY - Normal    Troponin I, High Sensitivity 5      Narrative:     Less than 99th percentile of normal range cutoff-  Female and children under 18 years old <35 ng/L; Male <54 ng/L: Negative  Repeat testing should be performed if clinically indicated.     Female and children under 18 years old  ng/L; Male  ng/L:  Consistent with possible cardiac damage and possible increased clinical   risk. Serial measurements may help to assess extent of myocardial damage.     >120 ng/L: Consistent with cardiac damage, increased clinical risk and  myocardial infarction. Serial measurements may help assess extent of   myocardial damage.      NOTE: Children less than 1 year old may have higher baseline troponin   levels and results should be interpreted in conjunction with the overall   clinical context.    NOTE: Troponin I testing is performed using a different   testing methodology at [facility] than at other   [facility]. Direct result comparisons should only   be made within the same method.      SARS-COV-2 AND INFLUENZA A/B PCR - Normal    Flu A Result Not Detected      Flu B Result Not Detected      Coronavirus 2019, PCR Not Detected      Narrative:     This assay has received FDA Emergency Use Authorization (EUA) and  is only authorized for the duration of time that circumstances exist to justify the authorization of the emergency use of in vitro diagnostic tests for the detection of SARS-CoV-2 virus and/or diagnosis of COVID-19 infection under section 564(b)(1) of the Act, 21 U.S.C. 360bbb-3(b)(1). Testing for SARS-CoV-2 is only recommended for patients who meet current clinical and/or epidemiological criteria as defined by federal, state, or local public health directives. This assay is an in vitro diagnostic nucleic acid amplification test for the qualitative detection of SARS-CoV-2, Influenza A, and Influenza B from nasopharyngeal specimens and has been validated for use at [facility]. Negative results do not preclude COVID-19 infections or Influenza A/B infections, and should not be used as the sole basis for diagnosis, treatment, or other management decisions. If Influenza A/B and RSV PCR results are negative, testing for Parainfluenza virus, Adenovirus and Metapneumovirus is routinely performed for [facility] pediatric oncology and intensive care inpatients, and is available on other patients by placing an add-on request.    BASIC METABOLIC PANEL   CBC         Past History     Past Medical History:   Diagnosis Date    Other conditions influencing health status     Uterine Cancer    Personal history of colonic polyps 09/14/2017    History of colon polyps    Personal history of diseases of the blood and blood-forming organs and certain disorders involving the immune mechanism 09/08/2017    History of anemia    Personal history of other diseases of the circulatory system 04/28/2016    History of hypertension     Past Surgical History:   Procedure Laterality Date    CHOLECYSTECTOMY   11/20/2013    Cholecystectomy    GASTRIC RESTRICTION SURGERY  11/20/2013    Gastric Surgery For Morbid Obesity    HYSTERECTOMY  11/20/2013    Hysterectomy    OTHER SURGICAL HISTORY  12/11/2015    Central IV Line With Subcutaneous [place] Mediport     Social History     Socioeconomic History    Marital status:      Spouse name: Not on file    Number of children: Not on file    Years of education: Not on file    Highest education level: Not on file   Occupational History    Not on file   Tobacco Use    Smoking status: Not on file    Smokeless tobacco: Not on file   Substance and Sexual Activity    Alcohol use: Not on file    Drug use: Not on file    Sexual activity: Not on file   Other Topics Concern    Not on file   Social History Narrative    Not on file     Social Determinants of Health     Financial Resource Strain: Not on file   Food Insecurity: Not on file   Transportation Needs: Not on file   Physical Activity: Not on file   Stress: Not on file   Social Connections: Not on file   Intimate Partner Violence: Not on file   Housing Stability: Not on file         Medications/Allergies     Previous Medications    No medications on file     Allergies   Allergen Reactions    Penicillin G Anaphylaxis       Review of systems:  All systems reviewed and otherwise negative, except as stated above in HPI.    Upon admission to the Clinical Decision Unit,  Myah Sutherland is alert and oriented and appears in no acute distress.  Vital signs were reviewed.  Upon examination lower extremities has some discoloration, temperature normal, distal pulses intact.  Mild peripheral edema.  No chest wall tenderness.  No JVD.  No focal neurological deficit.  Patient educated on plan of care and verbalized understanding.  Patient remained hemodynamically stable and neurologically intact on the rest of the night.  Physical Exam     Visit Vitals  /74 (BP Location: Left arm, Patient Position: Lying)   Pulse 74   Temp 37 °C (98.6  "°F) (Oral)   Resp 16   Ht 1.626 m (5' 4\")   Wt 95.3 kg (210 lb)   SpO2 98%   BMI 36.05 kg/m²   BSA 2.07 m²       GENERAL:  The patient appears nourished and normally developed. Vital signs as documented.     HEENT:  Head normocephalic, atraumatic, EOMs intact, PERRLA, Mucous membranes moist. Nares patent without copious rhinorrhea.  Oropharynx moist and clear.  Uvula midline.    Neck: Supple.  No meningismus.  No swelling.  Trachea midline. No lymphadenopathy.    Pulmonary:  Lungs are clear to auscultation, without any respiratory distress.  No wheezing, crackles or rales.  No hypoxia or dyspnea.  Able to speak full sentences, no accessory muscle use.    Cardia:   Regular rate and rhythm. No murmurs, rubs or gallops.  No JVD.    GI:  Soft, non-distended, non-tender, BS positive x 4 quadrants, No rebound or guarding, no peritoneal signs, no CVA tenderness, no masses or organomegaly.    Musculoskeletal: Symmetrical muscle bulk.  Mild peripheral edema.  Pulses intact distal.  Able to walk.    Integumentary: Discoloration to bilateral lower extremities.  Distal pulses intact.  Warm, dry and intact.  No pallor or jaundice.  No lesions, rashes or open sores.    NEURO:  No obvious neurological deficits, normal sensation and strength bilaterally.  Speech clear and fluent.  Able to follow commands, CN 2-12 intact.    Psych: Appropriate mood and affect.    Consultants: N/A            Impression and Plan  In summary, Myah Sutherland is admitted to the [facility] Clinical Decision Unit for chest pain. Dr. Kashif De León is the CDU admission attending.    This patient has been risk-stratified based on available history, physical exam, and related study findings. Admission to the observation status for further diagnosis/treatment/monitoring of chest pain is warranted clinically. This extended period of observation is specifically required to determine the need for hospitalization.     The goals of this " admission based on the patient’s clinical problem list are:   1.)  Chest pain  -Continuous telemetry monitoring  -Coronary CTA in a.m.  -Npo  -Continue home medications      We will observe the patient for the following endpoints:   1.)  Stable vitals  2.)  Symptomatic improvement  3.)  No acute arrhythmic changes.  4.)  Clear coronary CTA    When met, appropriate disposition will be arranged.    Molly Montalvo, APRN-CNP  [facility]  Emergency department  Extension [phone]

## 2024-02-09 NOTE — ED PROVIDER NOTES
HPI   Chief Complaint   Patient presents with    Chest Pain       Patient is a 78-year-old female with history of GERD, hypertension, asthma, hyperlipidemia, presenting to the emergency department with chest pain.  Patient states her symptoms came on earlier this morning, describing it as worse with deep breaths that radiated into her back.  Denies affiliated nausea, vomiting, diarrhea.  States she thought initially her symptoms were due to asthma as she also experiences an exacerbation in the setting of weather change but reports that her symptoms today feel different.  Patient also reports feeling short of breath with exertion and is out of breath when walking, using her walker to ambulate.  Denies headache, vision changes, neck pain, urinary symptoms including blood in the urine or stool.  Lives alone but is in close contact with her daughter to check on her frequently.  States otherwise she has been well and has no acute complaints.                          Hardeep Coma Scale Score: 15                     Patient History   Past Medical History:   Diagnosis Date    Other conditions influencing health status     Uterine Cancer    Personal history of colonic polyps 09/14/2017    History of colon polyps    Personal history of diseases of the blood and blood-forming organs and certain disorders involving the immune mechanism 09/08/2017    History of anemia    Personal history of other diseases of the circulatory system 04/28/2016    History of hypertension     Past Surgical History:   Procedure Laterality Date    CHOLECYSTECTOMY  11/20/2013    Cholecystectomy    GASTRIC RESTRICTION SURGERY  11/20/2013    Gastric Surgery For Morbid Obesity    HYSTERECTOMY  11/20/2013    Hysterectomy    OTHER SURGICAL HISTORY  12/11/2015    Central IV Line With Subcutaneous [place] Mediport     No family history on file.  Social History     Tobacco Use    Smoking status: Not on file    Smokeless tobacco: Not on file   Substance Use  Topics    Alcohol use: Not on file    Drug use: Not on file       Physical Exam   ED Triage Vitals   Temperature Heart Rate Respirations BP   02/08/24 1135 02/08/24 1135 02/08/24 1135 02/08/24 1135   36.7 °C (98 °F) 87 18 (!) 182/83      Pulse Ox Temp Source Heart Rate Source Patient Position   02/08/24 1135 [birth year] --   98 % Oral Monitor       BP Location FiO2 (%)     -- 02/08/24 1454      21 %       Physical Exam  Vitals and nursing note reviewed.   Constitutional:       General: She is not in acute distress.     Appearance: Normal appearance. She is not toxic-appearing.   HENT:      Head: Normocephalic.      Mouth/Throat:      Mouth: Mucous membranes are moist.   Eyes:      Conjunctiva/sclera: Conjunctivae normal.      Pupils: Pupils are equal, round, and reactive to light.   Cardiovascular:      Rate and Rhythm: Normal rate and regular rhythm.      Pulses:           Radial pulses are 2+ on the right side and 2+ on the left side.   Pulmonary:      Effort: Pulmonary effort is normal. No respiratory distress.      Breath sounds: Normal breath sounds.   Abdominal:      General: Abdomen is flat.      Palpations: Abdomen is soft.      Tenderness: There is no abdominal tenderness. There is no guarding or rebound.   Musculoskeletal:      Cervical back: Normal range of motion and neck supple.      Right lower leg: No edema.      Left lower leg: No edema.   Skin:     General: Skin is warm.   Neurological:      Mental Status: She is alert and oriented to person, place, and time.   Psychiatric:         Mood and Affect: Mood normal.         ED Course & MDM   Diagnoses as of 02/08/24 2251   Chest pain, unspecified type       Medical Decision Making  78-year-old female presenting to the emergency department with chest pain and exertional dyspnea.  On physical exam, patient is hemodynamically stable and in no acute distress.  No oxygen requirement satting well on room air.  Exam without evidence of volume  overload so doubt heart failure. EKG without signs of active ischemia. Given the timing of pain to ER presentation, troponin within normal limits so low concern for STEMI versus NSTEMI.  Presentation not consistent with acute PE however is endorsing dyspnea on exertion so this cannot be ruled out definitively.  Chest x-ray shows linear opacities which could be suggestive of a developing pneumonia however patient is afebrile and active cough COVID flu swabs are negative.  Not concern for pneumothorax (not visualized on chest xr), thoracic aortic dissection, pericarditis, tamponade, pneumonia (no infectious symptoms, clear chest xr), myocarditis (no recent illness, neg trop).  CT PE study obtained and shows no evidence of a pulmonary embolism HEART score:3 so plan to admit patient for risk stratification.  Patient updated with plan and is agreeable.  Patient admitted to our clinical decision unit in stable condition.        Procedure  Procedures    ATTENDING NOTE for Kashif De León MD:    ATTENDING ATTESTATION:  The patient was seen by the resident/fellow.  I have personally performed a substantive portion of the encounter.  I have seen and examined the patient; agree with the workup, evaluation, MDM, management and diagnosis.  The care plan has been discussed with the resident/fellow; I have reviewed the resident/fellow´s note and agree with the documented findings with the exception/addition of the following:    Patient is a 78-year-old who presents with chest pain and dyspnea with exertion.  No swelling of legs hemoptysis recent travel or trauma or history of DVT or PE.  She reports symptoms are exertional but still present at rest.  EKG not consistent occlusive MI and troponins are elevated however given we do not have a good explanation for symptoms, we did get a CTA pulmonary angiogram to look for PE and there is no evidence of pulmonary hypertension pericardial effusion or pulmonary embolism and no evidence of  significant pneumonia.  The exact etiology of her symptoms are unclear.  Discussed discharge with urgent outpatient follow-up versus observation and patient preferred observation.  I have looked at the EKG and agree with the resident interpretation.    ---------------------------------------------------------     Neha Morris DO  Resident  02/08/24 [id number]

## 2024-02-14 RX ORDER — METHYLPREDNISOLONE 4 MG/1
TABLET ORAL
COMMUNITY
Start: 2023-08-08 | End: 2024-02-15 | Stop reason: WASHOUT

## 2024-02-14 RX ORDER — AMLODIPINE BESYLATE 10 MG/1
1 TABLET ORAL DAILY
COMMUNITY
Start: 2018-11-02

## 2024-02-14 RX ORDER — BENZONATATE 100 MG/1
CAPSULE ORAL 3 TIMES DAILY PRN
COMMUNITY
Start: 2015-07-27 | End: 2024-02-15 | Stop reason: WASHOUT

## 2024-02-14 RX ORDER — CETIRIZINE HYDROCHLORIDE 10 MG/1
1 TABLET ORAL DAILY
COMMUNITY
Start: 2016-09-06

## 2024-02-14 RX ORDER — FERROUS SULFATE 325(65) MG
TABLET ORAL
COMMUNITY
Start: 2016-04-28

## 2024-02-14 RX ORDER — LANOLIN ALCOHOL/MO/W.PET/CERES
1 CREAM (GRAM) TOPICAL DAILY
COMMUNITY
Start: 2021-05-28

## 2024-02-14 RX ORDER — SENNOSIDES 25 MG/1
TABLET, FILM COATED ORAL
COMMUNITY
Start: 2021-12-01 | End: 2024-02-15 | Stop reason: WASHOUT

## 2024-02-14 RX ORDER — ATORVASTATIN CALCIUM 40 MG/1
1 TABLET, FILM COATED ORAL NIGHTLY
COMMUNITY
Start: 2019-05-29 | End: 2024-02-15 | Stop reason: WASHOUT

## 2024-02-14 RX ORDER — ERGOCALCIFEROL 1.25 MG/1
CAPSULE ORAL
COMMUNITY
Start: 2018-12-07 | End: 2024-02-15 | Stop reason: WASHOUT

## 2024-02-14 RX ORDER — AMMONIUM LACTATE 12 G/100G
CREAM TOPICAL
COMMUNITY
Start: 2023-12-11

## 2024-02-14 RX ORDER — CYANOCOBALAMIN (VITAMIN B-12) 500 MCG
TABLET ORAL
COMMUNITY
Start: 2018-02-27

## 2024-02-14 RX ORDER — LIDOCAINE 560 MG/1
PATCH PERCUTANEOUS; TOPICAL; TRANSDERMAL SEE ADMIN INSTRUCTIONS
COMMUNITY
Start: 2020-08-22 | End: 2024-02-15 | Stop reason: WASHOUT

## 2024-02-14 RX ORDER — LIDOCAINE 50 MG/G
PATCH TOPICAL
COMMUNITY
Start: 2020-08-18 | End: 2024-02-15 | Stop reason: WASHOUT

## 2024-02-14 RX ORDER — OXYCODONE AND ACETAMINOPHEN 5; 325 MG/1; MG/1
TABLET ORAL
COMMUNITY
Start: 2005-03-30 | End: 2024-02-15 | Stop reason: WASHOUT

## 2024-02-14 RX ORDER — BUDESONIDE AND FORMOTEROL FUMARATE DIHYDRATE 80; 4.5 UG/1; UG/1
AEROSOL RESPIRATORY (INHALATION) 2 TIMES DAILY PRN
COMMUNITY

## 2024-02-15 ENCOUNTER — OFFICE VISIT (OUTPATIENT)
Dept: PRIMARY CARE | Facility: CLINIC | Age: 79
End: 2024-02-15
Payer: COMMERCIAL

## 2024-02-15 VITALS
RESPIRATION RATE: 16 BRPM | HEIGHT: 64 IN | TEMPERATURE: 97.1 F | OXYGEN SATURATION: 100 % | SYSTOLIC BLOOD PRESSURE: 166 MMHG | HEART RATE: 71 BPM | DIASTOLIC BLOOD PRESSURE: 73 MMHG | WEIGHT: 222 LBS | BODY MASS INDEX: 37.9 KG/M2

## 2024-02-15 DIAGNOSIS — Z09 NEED FOR IMMUNIZATION FOLLOW-UP: ICD-10-CM

## 2024-02-15 DIAGNOSIS — I10 HYPERTENSION, UNSPECIFIED TYPE: ICD-10-CM

## 2024-02-15 DIAGNOSIS — E55.9 VITAMIN D DEFICIENCY: ICD-10-CM

## 2024-02-15 DIAGNOSIS — K21.9 GASTROESOPHAGEAL REFLUX DISEASE, UNSPECIFIED WHETHER ESOPHAGITIS PRESENT: ICD-10-CM

## 2024-02-15 DIAGNOSIS — R07.9 CHEST PAIN, UNSPECIFIED TYPE: ICD-10-CM

## 2024-02-15 DIAGNOSIS — E66.9 OBESITY WITHOUT SERIOUS COMORBIDITY, UNSPECIFIED CLASSIFICATION, UNSPECIFIED OBESITY TYPE: ICD-10-CM

## 2024-02-15 DIAGNOSIS — R73.03 PREDIABETES: Primary | ICD-10-CM

## 2024-02-15 LAB
25(OH)D3 SERPL-MCNC: 14 NG/ML (ref 30–100)
CHOLEST SERPL-MCNC: 184 MG/DL (ref 0–199)
CHOLESTEROL/HDL RATIO: 2
EST. AVERAGE GLUCOSE BLD GHB EST-MCNC: 126 MG/DL
HBA1C MFR BLD: 6 %
HDLC SERPL-MCNC: 93.9 MG/DL
LDLC SERPL CALC-MCNC: 83 MG/DL
NON HDL CHOLESTEROL: 90 MG/DL (ref 0–149)
TRIGL SERPL-MCNC: 38 MG/DL (ref 0–149)
VLDL: 8 MG/DL (ref 0–40)

## 2024-02-15 PROCEDURE — 36415 COLL VENOUS BLD VENIPUNCTURE: CPT | Performed by: NURSE PRACTITIONER

## 2024-02-15 PROCEDURE — 99214 OFFICE O/P EST MOD 30 MIN: CPT | Performed by: NURSE PRACTITIONER

## 2024-02-15 PROCEDURE — 1036F TOBACCO NON-USER: CPT | Performed by: NURSE PRACTITIONER

## 2024-02-15 PROCEDURE — 83036 HEMOGLOBIN GLYCOSYLATED A1C: CPT | Performed by: NURSE PRACTITIONER

## 2024-02-15 PROCEDURE — 80061 LIPID PANEL: CPT | Performed by: NURSE PRACTITIONER

## 2024-02-15 PROCEDURE — 90471 IMMUNIZATION ADMIN: CPT | Performed by: NURSE PRACTITIONER

## 2024-02-15 PROCEDURE — 1125F AMNT PAIN NOTED PAIN PRSNT: CPT | Performed by: NURSE PRACTITIONER

## 2024-02-15 PROCEDURE — 82306 VITAMIN D 25 HYDROXY: CPT | Performed by: NURSE PRACTITIONER

## 2024-02-15 PROCEDURE — 1159F MED LIST DOCD IN RCRD: CPT | Performed by: NURSE PRACTITIONER

## 2024-02-15 PROCEDURE — 3078F DIAST BP <80 MM HG: CPT | Performed by: NURSE PRACTITIONER

## 2024-02-15 PROCEDURE — 3077F SYST BP >= 140 MM HG: CPT | Performed by: NURSE PRACTITIONER

## 2024-02-15 RX ORDER — LOSARTAN POTASSIUM 50 MG/1
50 TABLET ORAL DAILY
Qty: 90 TABLET | Refills: 2 | Status: SHIPPED | OUTPATIENT
Start: 2024-02-15

## 2024-02-15 ASSESSMENT — PAIN SCALES - GENERAL: PAINLEVEL: 7

## 2024-02-15 NOTE — PATIENT INSTRUCTIONS
Thank you for coming in for your visit today!    Please follow up in 4 months or sooner if needed for next blood pressure follow up    INCREASE losartan to 50mg daily.  CONTINUE amlodipine  For your blood pressure:  Take your medication as directed. Try to take it around the same time daily.   Keep a log of your blood pressure. Be sure to bring it with you to your next appointment so we can review it together.  Adhere to the DASH diet. This includes decreasing your salt/sodium intake. Avoid canned foods, lunch meats, and frozen foods.  Exercise for 30 minutes daily.    Today we completed blood work. We will contact you with any abnormalities from this testing.    Call to schedule consultation with the cardiologist.         Call 911 or go to the emergency room if you have pain in your chest, difficulty breathing, or other life threatening symptoms.

## 2024-02-15 NOTE — PROGRESS NOTES
Subjective   Myah Sutherland is a 78 y.o. female who presents for No chief complaint on file..  HPI  Ms. Borrego is here today for 6 month follow up.   She was also seen in the emergency room on 2/9 with concern for chest pain. She was kept overnight in the CDU and after showing stability, was discharged.     She is open to adjustment in current blood pressure regimen. Denies headache, chest pain, palpitations, blurred vision, or dizziness  Notes that she is doing well. She often spends time with family, feels well supported in life.     All systems reviewed. Review of systems negative except for noted positives in HPI    Objective     There were no vitals taken for this visit.   Vital signs noted and reviewed.       Physical Exam  Constitutional:       Appearance: Normal appearance.   Cardiovascular:      Rate and Rhythm: Normal rate and regular rhythm.   Pulmonary:      Effort: Pulmonary effort is normal. No respiratory distress.      Breath sounds: Normal breath sounds.   Skin:     General: Skin is warm and dry.   Neurological:      Mental Status: She is oriented to person, place, and time.   Psychiatric:         Mood and Affect: Mood normal.             Assessment/Plan   Problem List Items Addressed This Visit    None

## 2024-02-17 LAB
ATRIAL RATE: 63 BPM
P AXIS: 60 DEGREES
P OFFSET: 185 MS
P ONSET: 123 MS
PR INTERVAL: 196 MS
Q ONSET: 221 MS
QRS COUNT: 11 BEATS
QRS DURATION: 90 MS
QT INTERVAL: 398 MS
QTC CALCULATION(BAZETT): 407 MS
QTC FREDERICIA: 404 MS
R AXIS: 58 DEGREES
T AXIS: 26 DEGREES
T OFFSET: 420 MS
VENTRICULAR RATE: 63 BPM

## 2024-06-14 PROBLEM — C55 UTERINE CANCER (MULTI): Status: ACTIVE | Noted: 2024-06-14

## 2024-06-14 PROBLEM — R05.9 COUGH: Status: ACTIVE | Noted: 2024-06-14

## 2024-06-14 PROBLEM — R32 URINARY INCONTINENCE: Status: ACTIVE | Noted: 2024-06-14

## 2024-06-14 PROBLEM — L29.9 ITCHING: Status: ACTIVE | Noted: 2024-06-14

## 2024-06-14 PROBLEM — R45.89 DEPRESSED MOOD: Status: ACTIVE | Noted: 2024-06-14

## 2024-06-14 PROBLEM — M62.81 MUSCLE WEAKNESS (GENERALIZED): Status: ACTIVE | Noted: 2024-06-14

## 2024-06-14 PROBLEM — D64.9 ANEMIA: Status: ACTIVE | Noted: 2024-06-14

## 2024-06-14 PROBLEM — E78.5 HYPERLIPIDEMIA: Status: ACTIVE | Noted: 2024-06-14

## 2024-06-14 PROBLEM — R26.89 BALANCE DISORDER: Status: ACTIVE | Noted: 2024-06-14

## 2024-06-14 PROBLEM — K59.00 CONSTIPATION: Status: ACTIVE | Noted: 2024-06-14

## 2024-06-14 PROBLEM — L84 CORNS AND CALLOSITIES: Status: ACTIVE | Noted: 2018-12-18

## 2024-06-14 PROBLEM — I82.C11 THROMBOSIS OF RIGHT INTERNAL JUGULAR VEIN (MULTI): Status: ACTIVE | Noted: 2024-06-14

## 2024-06-14 PROBLEM — M79.89 LEFT LEG SWELLING: Status: ACTIVE | Noted: 2024-06-14

## 2024-06-14 PROBLEM — L60.2 ONYCHOGRYPHOSIS: Status: ACTIVE | Noted: 2024-06-14

## 2024-06-14 PROBLEM — E66.9 OBESITY: Status: ACTIVE | Noted: 2024-06-14

## 2024-06-14 PROBLEM — M19.90 ARTHRITIS, SENESCENT: Status: ACTIVE | Noted: 2024-06-14

## 2024-06-14 PROBLEM — M17.0 OSTEOARTHRITIS OF BOTH KNEES: Status: ACTIVE | Noted: 2024-06-14

## 2024-06-14 PROBLEM — M79.671 RIGHT FOOT PAIN: Status: ACTIVE | Noted: 2024-06-14

## 2024-06-14 PROBLEM — M54.50 LOW BACK PAIN: Status: ACTIVE | Noted: 2024-06-14

## 2024-06-14 PROBLEM — M25.561 BILATERAL KNEE PAIN: Status: ACTIVE | Noted: 2024-06-14

## 2024-06-14 PROBLEM — R73.03 PRE-DIABETES: Status: ACTIVE | Noted: 2018-12-18

## 2024-06-14 PROBLEM — E55.9 VITAMIN D DEFICIENCY: Status: ACTIVE | Noted: 2024-06-14

## 2024-06-14 PROBLEM — D50.9 IRON DEFICIENCY ANEMIA: Status: ACTIVE | Noted: 2024-06-14

## 2024-06-14 PROBLEM — J45.909 ASTHMA (HHS-HCC): Status: ACTIVE | Noted: 2024-06-14

## 2024-06-14 PROBLEM — I10 BENIGN ESSENTIAL HTN: Status: ACTIVE | Noted: 2024-06-14

## 2024-06-14 PROBLEM — D12.6 TUBULAR ADENOMA OF COLON: Status: ACTIVE | Noted: 2024-06-14

## 2024-06-14 PROBLEM — R26.81 GAIT INSTABILITY: Status: ACTIVE | Noted: 2024-06-14

## 2024-06-14 PROBLEM — R92.8 ABNORMAL FINDING ON BREAST IMAGING: Status: ACTIVE | Noted: 2024-06-14

## 2024-06-14 PROBLEM — H40.9 GLAUCOMA: Status: ACTIVE | Noted: 2024-06-14

## 2024-06-14 PROBLEM — D50.0 IRON DEFICIENCY ANEMIA DUE TO CHRONIC BLOOD LOSS: Status: ACTIVE | Noted: 2024-06-14

## 2024-06-14 PROBLEM — M17.9 OSTEOARTHRITIS OF KNEE: Status: ACTIVE | Noted: 2024-06-14

## 2024-06-14 PROBLEM — M25.562 BILATERAL KNEE PAIN: Status: ACTIVE | Noted: 2024-06-14

## 2024-06-14 PROBLEM — B35.1 ONYCHOMYCOSIS: Status: ACTIVE | Noted: 2018-12-18

## 2024-06-14 PROBLEM — R91.8 LUNG MASS: Status: ACTIVE | Noted: 2024-06-14

## 2024-06-17 ENCOUNTER — OFFICE VISIT (OUTPATIENT)
Dept: PRIMARY CARE | Facility: CLINIC | Age: 79
End: 2024-06-17
Payer: COMMERCIAL

## 2024-06-17 VITALS
BODY MASS INDEX: 38.41 KG/M2 | RESPIRATION RATE: 16 BRPM | OXYGEN SATURATION: 99 % | HEIGHT: 64 IN | SYSTOLIC BLOOD PRESSURE: 155 MMHG | WEIGHT: 225 LBS | TEMPERATURE: 97.6 F | DIASTOLIC BLOOD PRESSURE: 76 MMHG | HEART RATE: 74 BPM

## 2024-06-17 DIAGNOSIS — K21.9 GASTROESOPHAGEAL REFLUX DISEASE WITHOUT ESOPHAGITIS: ICD-10-CM

## 2024-06-17 DIAGNOSIS — R73.03 PREDIABETES: ICD-10-CM

## 2024-06-17 DIAGNOSIS — J45.20 MILD INTERMITTENT ASTHMA WITHOUT COMPLICATION (HHS-HCC): ICD-10-CM

## 2024-06-17 DIAGNOSIS — I10 PRIMARY HYPERTENSION: Primary | ICD-10-CM

## 2024-06-17 DIAGNOSIS — R10.9 FLANK PAIN: ICD-10-CM

## 2024-06-17 LAB
ANION GAP SERPL CALC-SCNC: 14 MMOL/L (ref 10–20)
BUN SERPL-MCNC: 13 MG/DL (ref 6–23)
CALCIUM SERPL-MCNC: 9 MG/DL (ref 8.6–10.6)
CHLORIDE SERPL-SCNC: 106 MMOL/L (ref 98–107)
CHOLEST SERPL-MCNC: 194 MG/DL (ref 0–199)
CHOLESTEROL/HDL RATIO: 2
CO2 SERPL-SCNC: 27 MMOL/L (ref 21–32)
CREAT SERPL-MCNC: 0.74 MG/DL (ref 0.5–1.05)
EGFRCR SERPLBLD CKD-EPI 2021: 83 ML/MIN/1.73M*2
EST. AVERAGE GLUCOSE BLD GHB EST-MCNC: 126 MG/DL
GLUCOSE SERPL-MCNC: 86 MG/DL (ref 74–99)
HBA1C MFR BLD: 6 %
HDLC SERPL-MCNC: 96.7 MG/DL
LDLC SERPL CALC-MCNC: 90 MG/DL
NON HDL CHOLESTEROL: 97 MG/DL (ref 0–149)
POC APPEARANCE, URINE: ABNORMAL
POC BILIRUBIN, URINE: NEGATIVE
POC BLOOD, URINE: ABNORMAL
POC COLOR, URINE: YELLOW
POC GLUCOSE, URINE: NEGATIVE MG/DL
POC KETONES, URINE: NEGATIVE MG/DL
POC LEUKOCYTES, URINE: ABNORMAL
POC NITRITE,URINE: NEGATIVE
POC PH, URINE: 6 PH
POC PROTEIN, URINE: NEGATIVE MG/DL
POC SPECIFIC GRAVITY, URINE: >=1.03
POC UROBILINOGEN, URINE: 0.2 EU/DL
POTASSIUM SERPL-SCNC: 4.1 MMOL/L (ref 3.5–5.3)
SODIUM SERPL-SCNC: 143 MMOL/L (ref 136–145)
TRIGL SERPL-MCNC: 37 MG/DL (ref 0–149)
VLDL: 7 MG/DL (ref 0–40)

## 2024-06-17 PROCEDURE — 1126F AMNT PAIN NOTED NONE PRSNT: CPT | Performed by: NURSE PRACTITIONER

## 2024-06-17 PROCEDURE — 83036 HEMOGLOBIN GLYCOSYLATED A1C: CPT | Performed by: NURSE PRACTITIONER

## 2024-06-17 PROCEDURE — 3077F SYST BP >= 140 MM HG: CPT | Performed by: NURSE PRACTITIONER

## 2024-06-17 PROCEDURE — 80048 BASIC METABOLIC PNL TOTAL CA: CPT | Performed by: NURSE PRACTITIONER

## 2024-06-17 PROCEDURE — 80061 LIPID PANEL: CPT | Performed by: NURSE PRACTITIONER

## 2024-06-17 PROCEDURE — 3078F DIAST BP <80 MM HG: CPT | Performed by: NURSE PRACTITIONER

## 2024-06-17 PROCEDURE — 87086 URINE CULTURE/COLONY COUNT: CPT | Performed by: NURSE PRACTITIONER

## 2024-06-17 PROCEDURE — 36415 COLL VENOUS BLD VENIPUNCTURE: CPT | Performed by: NURSE PRACTITIONER

## 2024-06-17 PROCEDURE — 99214 OFFICE O/P EST MOD 30 MIN: CPT | Performed by: NURSE PRACTITIONER

## 2024-06-17 PROCEDURE — 81002 URINALYSIS NONAUTO W/O SCOPE: CPT | Performed by: NURSE PRACTITIONER

## 2024-06-17 PROCEDURE — 1036F TOBACCO NON-USER: CPT | Performed by: NURSE PRACTITIONER

## 2024-06-17 RX ORDER — ALUMINUM HYDROXIDE, MAGNESIUM HYDROXIDE, AND SIMETHICONE 1200; 120; 1200 MG/30ML; MG/30ML; MG/30ML
15 SUSPENSION ORAL EVERY 6 HOURS PRN
Qty: 355 ML | Refills: 3 | Status: SHIPPED | OUTPATIENT
Start: 2024-06-17 | End: 2024-06-27

## 2024-06-17 RX ORDER — FAMOTIDINE 20 MG/1
20 TABLET, FILM COATED ORAL DAILY PRN
Qty: 30 TABLET | Refills: 5 | Status: SHIPPED | OUTPATIENT
Start: 2024-06-17 | End: 2024-06-19

## 2024-06-17 RX ORDER — BENZONATATE 100 MG/1
100 CAPSULE ORAL 3 TIMES DAILY PRN
Qty: 42 CAPSULE | Refills: 6 | Status: SHIPPED | OUTPATIENT
Start: 2024-06-17

## 2024-06-17 RX ORDER — ALBUTEROL SULFATE 0.83 MG/ML
3 SOLUTION RESPIRATORY (INHALATION) EVERY 6 HOURS PRN
Qty: 75 ML | Refills: 2 | Status: SHIPPED | OUTPATIENT
Start: 2024-06-17

## 2024-06-17 ASSESSMENT — PATIENT HEALTH QUESTIONNAIRE - PHQ9
SUM OF ALL RESPONSES TO PHQ9 QUESTIONS 1 AND 2: 0
1. LITTLE INTEREST OR PLEASURE IN DOING THINGS: NOT AT ALL
2. FEELING DOWN, DEPRESSED OR HOPELESS: NOT AT ALL

## 2024-06-17 ASSESSMENT — PAIN SCALES - GENERAL: PAINLEVEL: 0-NO PAIN

## 2024-06-17 ASSESSMENT — ENCOUNTER SYMPTOMS
OCCASIONAL FEELINGS OF UNSTEADINESS: 0
LOSS OF SENSATION IN FEET: 0
DEPRESSION: 0

## 2024-06-17 NOTE — PATIENT INSTRUCTIONS
Thank you for coming in for your visit today!    Please follow up in 5 months or sooner if needed.    Today we completed blood work. We will contact you with any abnormalities from this testing.      For your blood pressure:  Take your medication as directed. Try to take it around the same time daily.   Keep a log of your blood pressure. Be sure to bring it with you to your next appointment so we can review it together.  Adhere to the DASH diet. This includes decreasing your salt/sodium intake. Avoid canned foods, lunch meats, and frozen foods.  Exercise for 30 minutes daily.      Call 911 or go to the emergency room if you have pain in your chest, difficulty breathing, or other life threatening symptoms.

## 2024-06-17 NOTE — PROGRESS NOTES
"Subjective   Myah Sutherland is a 78 y.o. female who presents for No chief complaint on file..  HPI  Ms. Sutherland is a 79 yo F here today for follow up. Notes that she has recently been grieving the death of 6 different family members in the last few months.   She feels good support within her family and friends. Feeling mildly down, but denies depression.    Notes that she has been taking her blood pressure medication consistently. She did eat a high sodium diet yesterday (corned beef) which she knows will impact her blood pressure usually into the following day. Denies headache, chest pain, palpitations, blurred vision, or dizziness    Denies constipation  Has heartburn sometimes and would like medication to treat as needed. Took a liquid in the past that was helpful and she would like a refill    She has been coughing recently, moreso at nighttime. Had some pain in her side, but it may be from coughing.       All systems reviewed. Review of systems negative except for noted positives in HPI    Objective     /76   Pulse 74   Temp 36.4 °C (97.6 °F)   Resp 16   Ht 1.626 m (5' 4\")   Wt 102 kg (225 lb)   SpO2 99%   BMI 38.62 kg/m²    Vital signs noted and reviewed.       Physical Exam  Constitutional:       Appearance: Normal appearance.   Cardiovascular:      Rate and Rhythm: Normal rate and regular rhythm.   Pulmonary:      Effort: Pulmonary effort is normal. No respiratory distress.      Breath sounds: Normal breath sounds.   Skin:     General: Skin is warm and dry.   Neurological:      Mental Status: She is oriented to person, place, and time.   Psychiatric:         Mood and Affect: Mood normal.             Assessment/Plan   Problem List Items Addressed This Visit       Asthma ([facility])    Relevant Medications    albuterol 2.5 mg /3 mL (0.083 %) nebulizer solution    benzonatate (Tessalon) 100 mg capsule     Other Visit Diagnoses       Primary hypertension    -  Primary    Relevant Orders    Lipid Panel "    Hemoglobin A1C    Basic Metabolic Panel    Gastroesophageal reflux disease without esophagitis        Relevant Medications    alum-mag hydroxide-simeth (Maalox Advanced) 200-200-20 mg/5 mL oral suspension    famotidine (Pepcid) 20 mg tablet    Flank pain        Relevant Orders    POCT UA (nonautomated) manually resulted (Completed)    Urine Culture    Prediabetes        Relevant Orders    Hemoglobin A1C    Basic Metabolic Panel

## 2024-06-19 RX ORDER — FAMOTIDINE 20 MG/1
TABLET, FILM COATED ORAL
Qty: 90 TABLET | Refills: 1 | Status: SHIPPED | OUTPATIENT
Start: 2024-06-19

## 2024-07-25 ENCOUNTER — OFFICE VISIT (OUTPATIENT)
Dept: GYNECOLOGIC ONCOLOGY | Facility: CLINIC | Age: 79
End: 2024-07-25
Payer: COMMERCIAL

## 2024-07-25 VITALS
OXYGEN SATURATION: 96 % | SYSTOLIC BLOOD PRESSURE: 147 MMHG | BODY MASS INDEX: 36.52 KG/M2 | TEMPERATURE: 97.2 F | HEART RATE: 90 BPM | RESPIRATION RATE: 18 BRPM | DIASTOLIC BLOOD PRESSURE: 87 MMHG | WEIGHT: 212.74 LBS

## 2024-07-25 DIAGNOSIS — M62.81 MUSCLE WEAKNESS (GENERALIZED): ICD-10-CM

## 2024-07-25 DIAGNOSIS — G62.0 DRUG-INDUCED POLYNEUROPATHY (MULTI): ICD-10-CM

## 2024-07-25 DIAGNOSIS — Z74.09 IMPAIRED MOBILITY: ICD-10-CM

## 2024-07-25 DIAGNOSIS — C54.1 ENDOMETRIAL CANCER (MULTI): Primary | ICD-10-CM

## 2024-07-25 PROCEDURE — 3077F SYST BP >= 140 MM HG: CPT | Performed by: NURSE PRACTITIONER

## 2024-07-25 PROCEDURE — 3079F DIAST BP 80-89 MM HG: CPT | Performed by: NURSE PRACTITIONER

## 2024-07-25 PROCEDURE — 99215 OFFICE O/P EST HI 40 MIN: CPT | Performed by: NURSE PRACTITIONER

## 2024-07-25 PROCEDURE — 1126F AMNT PAIN NOTED NONE PRSNT: CPT | Performed by: NURSE PRACTITIONER

## 2024-07-25 PROCEDURE — 1159F MED LIST DOCD IN RCRD: CPT | Performed by: NURSE PRACTITIONER

## 2024-07-25 ASSESSMENT — PAIN SCALES - GENERAL: PAINLEVEL: 0-NO PAIN

## 2024-07-25 NOTE — PROGRESS NOTES
Patient ID: Myah Sutherland is a 78 y.o. female.    Subjective    HPI    68 year old with history of uterine carcinosarcoma with initial diagnosis in 2008. Patient experienced a pulmonary recurrence in 2013. Pulmonary lesion was resected  - patient then began adjuvant chemotherapy on GOG trial. Patient received x 5 cycles Carboplatin/Taxol with cycle 6 held due to severe, diffuse skin rash.  Last chemotherapy May 2014     No evidence of cancer completion of therapy.     CA-125 blood test  January 28 was 4.4     CT scan on January 28, 2015 of chest abdomen and pelvis shows a stable spiculated lesion in the right upper lobe measuring 7 mm.     No evidence of recurrent cancer in the abdomen or pelvis on CT May 28, 2015.     No evidence of progression on CT scan August 28, 2015 4/7/16 CT chest - no evidence of pulmonary embolus, no evidence of metastatic disease      She was seen in the emergency room on April 07, 2016 with complaints of shortness of breath.     CT scan of the chest showed no evidence of pulmonary embolism or cancer.     Basic chemistry tests were unremarkable.     CT scan December 29, 2016 shows no evidence of recurrent carcinosarcoma.  There are stable postoperative changes in the abdomen and pelvis.  Linear nodular density in the right apex is stable.     There was a low density area in the region of her Mediport catheter.  Thrombus in the right jugular vein was suspected.     On January 04, 2016 she had an ultrasound in the radiology department.  This confirmed presence of a clot, but could not determine if it was acute or chronic.     She was then sent to the vascular lab where ultrasound assessment showed her to be an acute clot, nonocclusive, with no evidence of tension of clot to the right upper extremity.     A prescription for xarelto was called in and she began 15 mg dose twice a day on January 04, 2016      Mediport was removed in January 2017     3/21/17 Doppler ultrasound for DVT -  negative for bilateral lower extremity DVT, lymph nodes noted in left groin      Her 51-year-old daughter Meeta  unexpectedly on May 11, 2017.  Meeta had accompanied [place] to all of her chemotherapy treatments.     Is no longer taking letrozole, on the advice of her hematologist.     CT scan 2017 showed no evidence of disease in the chest abdomen or pelvis.     CT A/P scan 18 - no disease in abdomen or pelvis.     Interval History: Myah is a 78 year old female with a history of recurrent carcinosarcoma of the uterus with no evidence of relapse since completing chemotherapy in 2014. Patient denies any vaginal bleeding, pink tinged discharge. Patient denies any constipation or diarrhea. Appetite has been good. Energy levels are baseline. Patient denies hematuria.  Patient denies urinary leakage or incontinence. Patient is not currently sexually active. Denies nausea or vomiting. Mammogram is up to date. She is feeling well.        Objective    BSA: 2.09 meters squared  /87   Pulse 90   Temp 36.2 °C (97.2 °F)   Resp 18   Wt 96.5 kg (212 lb 11.9 oz)   SpO2 96%   BMI 36.52 kg/m²      Physical Exam  Vitals and nursing note reviewed.   Constitutional:       Appearance: Normal appearance. She is normal weight.   HENT:      Mouth/Throat:      Mouth: Mucous membranes are moist.      Pharynx: Oropharynx is clear.   Eyes:      Conjunctiva/sclera: Conjunctivae normal.      Pupils: Pupils are equal, round, and reactive to light.   Cardiovascular:      Rate and Rhythm: Normal rate and regular rhythm.   Pulmonary:      Effort: Pulmonary effort is normal.      Breath sounds: Normal breath sounds.   Abdominal:      General: Abdomen is flat. There is no distension.      Palpations: Abdomen is soft. There is no mass.      Tenderness: There is no abdominal tenderness.   Genitourinary:     General: Normal vulva.      Vagina: Normal.      Uterus: Absent.       Rectum: Normal.   Musculoskeletal:          General: Normal range of motion.   Skin:     General: Skin is warm and dry.   Neurological:      Mental Status: She is alert.   Psychiatric:         Mood and Affect: Mood normal.         Behavior: Behavior normal.         Performance Status:  Asymptomatic      Assessment/Plan     Patient here today for a routine cancer surveillance visit for a history of recurrent carcinosarcoma of the uterus with no evidence of relapse since completing chemotherapy  in 5/2014.      Plan:    Physical examination was within normal limits today.  She is currently SHARIF.  We reviewed signs and symptoms of possible recurrence with the patient and she will call our office should she experience any of these.    Follow up in 1 year or sooner if needed     Patient was notified and provided with a letter informing her that [id number] has been terminated and that we will no longer collect follow up data.    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 Bedside commode necessary due to room confinement, unable to ambulate long distance to bathroom.     Mirella Watkins, APRN-CNP

## 2024-07-26 ENCOUNTER — SOCIAL WORK (OUTPATIENT)
Dept: CASE MANAGEMENT | Facility: HOSPITAL | Age: 79
End: 2024-07-26
Payer: COMMERCIAL

## 2024-07-26 NOTE — PROGRESS NOTES
PEGGY received referral SINA Fish CNP stating patient is requesting an order for a commode and electric scooter. Patient also seeking a letter in order [facility]. PEGGY met with patient on 7/25/24 in the clinic. Patient appears A&Ox3. Patient  uses a rollator for ambulating. Patient reported her scooter recently broke and she enjoys using it to be more independent out of the home. She enjoys fishing and being out and about. Patient was not aware where she received her scooter before or where she received her rollator. She reported it was delivered. SW will review chart and get back to patient. PEGGY provided patient with a letter in order to receive [facility]. Patient reported there is a store near her home where she can get new clothing. Patient stated that recently her apt had contractors and all of her appliances were unplugged and she lost a lot of food. SW provided support. Patient reported she had a visit from [facility] on 7/24/24. PEGGY left message with SARAH today to determine who patient's CM is but patient is not active. PEGGY was directed [facility] member services [phone]. PEGGY called and patient's CM is Mirta Anderson [phone]. PEGGY called  to check if she could see where patient received it.  completed chart review [employer] [phone] provided DME in 2017. PEGGY called the number and the the company is now [employer]. Patient is not in the system. After chart review, patient had her rollator order was order from [facility]. PEGGY faxed order to [employer] at [phone] for commode and scooter.     7/30/24: PEGGY called [employer] to check on the DME order. Patient will need supporting documentation from provider re: if patient is floor confined or room confined as Medicare requires for the commode. PEGGY messaged SINA Fish CNP and will update note 7/31. [employer] sends the motorized scooter orders to [employer]. PEGGY called  [employer] today and they only accept private pay option as reimbursement is too low. PEGGY called patient's [place] CM and left message asking for other DME companies to use. PEGGY emailed [employer] if there is option. Peggy also called [employer] provider line and was give a list of DME providers. PEGGY called [facility] and SW was directed to [employer] phone [phone] and fax [phone]. PEGGY faxed order to [employer] today.    7/31/24: SW received email from Aristeo Jimenez from [employer] and there is a lightweight power w/c available. PEGGY attempted to call patient today but no answer and no way to leave a message.    8/1/24: PEGGY called [employer] [place] [phone] to check on motorized scooter order. SW left contact information and will receive a call back. PEGGY faxed updated note re: commode to [email or web address] fax at [phone].     8/5/24: PEGGY called [email or web address] to confirm notes were received for bedside commode. Notes were received. PEGGY also called [employer] today to check on motorized scooter order and SW was informed there are only motorized w/c and not scooters. PEGGY called [employer] and [employer] and no options of scooters or private pay. PEGGY left message for Sanjana at [employer] [phone] asking if there is an option for a motorized scooter under patient's insurance. PEGGY also attempted to call patient again today and no answer and no way to leave VM.    8/6/24: PEGGY called [employer] to check on patient's order for the bedside commode. Per Rose patient's insurance is being checked and then the commode will be sent UPS to patient's home. PEGGY attempted to call patient today to update but no answer and no way to leave VM.    8/7/24: PEGGY called Sanjana from [employer] [phone] ext [phone] to check to see if patient is eligible for a motorized scooter. If patient has waiver with [employer] patient could have her CM bid for a contract. PEGGY reported patient has a CM and will ask the CM to reach out to her. PEGGY faxed the  order to [phone] today. PEGGY called patient's CM Mirta Anderson [phone]. She confirmed patient is part of waiver services and PEGGY provided her with contact information for [employer] and asked her to reach out to start process. PEGGY made CM aware the commode was ordered and will be delivered from [place]. PEGGY made CM aware SW has been trying to reach patient but no way to leave VM.     8/8/24: PEGGY called Sanjana at [employer] today to see if the fax was received yesterday. She plans to check and call SW back.    Update: the fax was received. Patient is encouraged to go to the store and pick out what she would like. [employer] [place] [id number]. SW attempted to call patient today to make aware but her phone is off and no way to leave VM since it has not been set up.    9/23/24: SW received phone call from patient on 9/20/24. SW spoke to patient on 9/23. Patient is checking on the status of her motorized scooter. She wants to take a trip to [place] with her family. The last time the scooter was ordered within 48 hours. PEGGY made her aware SW left message with [employer] to check on status and also with [employer] to check on status. SW will keep her updated. SW also offered to check with [employer] to see if there is one available for donation. Patient would like her own ordered.     9/26/24: PEGGY left message with [employer] Anderson today. PEGGY also called Sanjana at [employer] and since patient has Medicare she is not able to work with Medicare insurance. She suggested calling [employer]. SW called [employer] and was informed they do not have electric scooters.     10/3/24: PEGGY spoke with [facility] HER[facility] today and was informed that patient's scooter will not be covered under Medicaid and Medicare would cover. PEGGY was informed the previous scooter was under Medicare  and ordered through [employer] which is now [facility] [employer]. PEGGY called [employer]   at [phone] and faxed over order at [phone]  DME Referral     Referral Source: SINA Ward CNP  Equipment Needed:  Electric scooter  DME Company:  [employer] (P: [phone]/F: [phone])  Referral completed by:  RUDY Lynch   Referral submitted: via fax on 10/03/24  Additional Information: PEGGY called patient today to update on status. PEGGY was not able to leave  and mailbox was not set up.     10/4/24: PEGGY received phone call from Trevin at [employer] [phone] re: SW order. He reported that with patient's insurance there is strict guidelines for patient to meet medical necessity criteria in the home as patient has to require the scooter for use in the home and why a walker, cane and w/c will not work. Trevin would like to have this order for to a physician that knows patient better. Peggy provided name and contact for PCP XIOMARA Adkins CNP. Patient will call the patient and explain the process. SW offered and other services as needed.

## 2024-11-14 ENCOUNTER — OFFICE VISIT (OUTPATIENT)
Dept: PRIMARY CARE | Facility: CLINIC | Age: 79
End: 2024-11-14
Payer: COMMERCIAL

## 2024-11-14 VITALS
WEIGHT: 212 LBS | HEIGHT: 64 IN | DIASTOLIC BLOOD PRESSURE: 71 MMHG | SYSTOLIC BLOOD PRESSURE: 176 MMHG | HEART RATE: 78 BPM | TEMPERATURE: 98 F | BODY MASS INDEX: 36.19 KG/M2 | RESPIRATION RATE: 16 BRPM | OXYGEN SATURATION: 96 %

## 2024-11-14 DIAGNOSIS — I10 BENIGN ESSENTIAL HTN: Primary | ICD-10-CM

## 2024-11-14 DIAGNOSIS — E66.812 CLASS 2 OBESITY WITHOUT SERIOUS COMORBIDITY WITH BODY MASS INDEX (BMI) OF 36.0 TO 36.9 IN ADULT, UNSPECIFIED OBESITY TYPE: ICD-10-CM

## 2024-11-14 DIAGNOSIS — E55.9 VITAMIN D DEFICIENCY: ICD-10-CM

## 2024-11-14 DIAGNOSIS — E78.5 HYPERLIPIDEMIA, UNSPECIFIED HYPERLIPIDEMIA TYPE: ICD-10-CM

## 2024-11-14 DIAGNOSIS — Z12.31 ENCOUNTER FOR SCREENING MAMMOGRAM FOR MALIGNANT NEOPLASM OF BREAST: ICD-10-CM

## 2024-11-14 DIAGNOSIS — R73.03 PREDIABETES: ICD-10-CM

## 2024-11-14 DIAGNOSIS — Z00.00 ROUTINE GENERAL MEDICAL EXAMINATION AT HEALTH CARE FACILITY: ICD-10-CM

## 2024-11-14 LAB
25(OH)D3 SERPL-MCNC: 29 NG/ML (ref 30–100)
ANION GAP SERPL CALC-SCNC: 15 MMOL/L (ref 10–20)
BUN SERPL-MCNC: 19 MG/DL (ref 6–23)
CALCIUM SERPL-MCNC: 8.9 MG/DL (ref 8.6–10.6)
CHLORIDE SERPL-SCNC: 106 MMOL/L (ref 98–107)
CO2 SERPL-SCNC: 26 MMOL/L (ref 21–32)
CREAT SERPL-MCNC: 0.77 MG/DL (ref 0.5–1.05)
EGFRCR SERPLBLD CKD-EPI 2021: 80 ML/MIN/1.73M*2
ERYTHROCYTE [DISTWIDTH] IN BLOOD BY AUTOMATED COUNT: 19 % (ref 11.5–14.5)
EST. AVERAGE GLUCOSE BLD GHB EST-MCNC: 126 MG/DL
GLUCOSE SERPL-MCNC: 80 MG/DL (ref 74–99)
HBA1C MFR BLD: 6 %
HCT VFR BLD AUTO: 32.7 % (ref 36–46)
HGB BLD-MCNC: 9.4 G/DL (ref 12–16)
MCH RBC QN AUTO: 21.7 PG (ref 26–34)
MCHC RBC AUTO-ENTMCNC: 28.7 G/DL (ref 32–36)
MCV RBC AUTO: 75 FL (ref 80–100)
NRBC BLD-RTO: 0 /100 WBCS (ref 0–0)
PLATELET # BLD AUTO: 232 X10*3/UL (ref 150–450)
POTASSIUM SERPL-SCNC: 5.5 MMOL/L (ref 3.5–5.3)
RBC # BLD AUTO: 4.34 X10*6/UL (ref 4–5.2)
SODIUM SERPL-SCNC: 141 MMOL/L (ref 136–145)
WBC # BLD AUTO: 3.3 X10*3/UL (ref 4.4–11.3)

## 2024-11-14 PROCEDURE — 3077F SYST BP >= 140 MM HG: CPT | Performed by: NURSE PRACTITIONER

## 2024-11-14 PROCEDURE — 1159F MED LIST DOCD IN RCRD: CPT | Performed by: NURSE PRACTITIONER

## 2024-11-14 PROCEDURE — 1160F RVW MEDS BY RX/DR IN RCRD: CPT | Performed by: NURSE PRACTITIONER

## 2024-11-14 PROCEDURE — 1125F AMNT PAIN NOTED PAIN PRSNT: CPT | Performed by: NURSE PRACTITIONER

## 2024-11-14 PROCEDURE — 99215 OFFICE O/P EST HI 40 MIN: CPT | Performed by: NURSE PRACTITIONER

## 2024-11-14 PROCEDURE — G0439 PPPS, SUBSEQ VISIT: HCPCS | Performed by: NURSE PRACTITIONER

## 2024-11-14 PROCEDURE — 3078F DIAST BP <80 MM HG: CPT | Performed by: NURSE PRACTITIONER

## 2024-11-14 PROCEDURE — 82306 VITAMIN D 25 HYDROXY: CPT | Performed by: NURSE PRACTITIONER

## 2024-11-14 PROCEDURE — 82374 ASSAY BLOOD CARBON DIOXIDE: CPT | Performed by: NURSE PRACTITIONER

## 2024-11-14 PROCEDURE — 85027 COMPLETE CBC AUTOMATED: CPT | Performed by: NURSE PRACTITIONER

## 2024-11-14 PROCEDURE — 36415 COLL VENOUS BLD VENIPUNCTURE: CPT | Performed by: NURSE PRACTITIONER

## 2024-11-14 PROCEDURE — 83036 HEMOGLOBIN GLYCOSYLATED A1C: CPT | Performed by: NURSE PRACTITIONER

## 2024-11-14 PROCEDURE — 1170F FXNL STATUS ASSESSED: CPT | Performed by: NURSE PRACTITIONER

## 2024-11-14 PROCEDURE — 1036F TOBACCO NON-USER: CPT | Performed by: NURSE PRACTITIONER

## 2024-11-14 RX ORDER — LANOLIN ALCOHOL/MO/W.PET/CERES
1000 CREAM (GRAM) TOPICAL DAILY
Qty: 90 TABLET | Refills: 1 | Status: SHIPPED | OUTPATIENT
Start: 2024-11-14

## 2024-11-14 RX ORDER — ATORVASTATIN CALCIUM 40 MG/1
40 TABLET, FILM COATED ORAL DAILY
Qty: 100 TABLET | Refills: 3 | Status: SHIPPED | OUTPATIENT
Start: 2024-11-14 | End: 2025-12-19

## 2024-11-14 ASSESSMENT — ACTIVITIES OF DAILY LIVING (ADL)
DOING_HOUSEWORK: NEEDS ASSISTANCE
DRESSING: INDEPENDENT
BATHING: INDEPENDENT
GROCERY_SHOPPING: INDEPENDENT
MANAGING_FINANCES: INDEPENDENT

## 2024-11-14 ASSESSMENT — ENCOUNTER SYMPTOMS
LOSS OF SENSATION IN FEET: 0
DEPRESSION: 0
OCCASIONAL FEELINGS OF UNSTEADINESS: 0

## 2024-11-14 ASSESSMENT — PAIN SCALES - GENERAL: PAINLEVEL_OUTOF10: 8

## 2024-11-14 NOTE — PATIENT INSTRUCTIONS
Thank you for coming in for your visit today!    Please follow up in 6 months or sooner if needed.    Today we completed blood work. We will contact you with any abnormalities from this testing.    For your blood pressure:  Take your medication as directed. Try to take it around the same time daily.   Keep a log of your blood pressure. Be sure to bring it with you to your next appointment so we can review it together.  Adhere to the DASH diet. This includes decreasing your salt/sodium intake. Avoid canned foods, lunch meats, and frozen foods.  Exercise for 30 minutes daily.    RESTART atorvastatin. This can be taken at bedtime.     Confirm our fax number with [employer] [phone].     Call to schedule mammogram [phone] and follow up eye exam, as discussed.     Call [phone] or go to the emergency room if you have pain in your chest, difficulty breathing, or other life threatening symptoms.

## 2024-11-14 NOTE — PROGRESS NOTES
"Subjective   Yocesar Sutherland is a 78 y.o. female who presents for Medicare Annual Wellness Visit Subsequent.  HPI  Koko Quintana is a 77 yo F here today for follow up and medicare wellness  She notes that she feels blessed to have her health. She spends ample time with her children and grandchildren. She feels safe at home. Uses her walker for ambulation, which she jokingly calls her \"michael\", but notes that she feels stable and confident as long as she has her rollator if needed. She has been taking medications consistently but was out of routine, and forgot to take her blood pressure medications this morning. Does not need refills at this time. Adamantly feels her elevated blood pressure value is due to missing her medications. Denies headache, chest pain, palpitations, blurred vision, or dizziness  Diastolic value is WNL.    Declines influenza vaccine today. She is also uninterested in COVID booster or RSV vaccine at this time. She did receive her pneumonia vaccine.    She feels her health is centered in her shad. Her mother and daughter are both ministers. She feels that she has always felt close to God and that it has helped her to remain focused and healthy.    Eye exam- about due for follow up. She is well connected, will call to schedule.      We discuss ASCVD risk score calculator. She was previously taking atorvastatin. She is unsure why medication was discontinued. She is open to restarting. Notes that she was tolerating the medication without issue.     She defers end of life planning today.     All systems reviewed. Review of systems negative except for noted positives in HPI    Objective     /71   Pulse 78   Temp 36.7 °C (98 °F)   Resp 16   Ht 1.626 m (5' 4\")   Wt 96.2 kg (212 lb)   SpO2 96%   BMI 36.39 kg/m²    Vital signs noted and reviewed.       Physical Exam  Constitutional:       Appearance: Normal appearance.   Cardiovascular:      Rate and Rhythm: Normal rate and regular rhythm. "   Pulmonary:      Effort: Pulmonary effort is normal. No respiratory distress.      Breath sounds: Normal breath sounds.   Skin:     General: Skin is warm and dry.   Neurological:      Mental Status: She is oriented to person, place, and time.   Psychiatric:         Mood and Affect: Mood normal.             Assessment/Plan   Problem List Items Addressed This Visit       Benign essential HTN - Primary    Relevant Orders    Basic Metabolic Panel    CBC    Hyperlipidemia    Relevant Medications    atorvastatin (Lipitor) 40 mg tablet    Obesity    Relevant Medications    cyanocobalamin (Vitamin B-12) 1,000 mcg tablet    Vitamin D deficiency    Relevant Orders    Vitamin D 25-Hydroxy,Total (for eval of Vitamin D levels)     Other Visit Diagnoses       Encounter for screening mammogram for malignant neoplasm of breast        Relevant Orders    BI mammo bilateral screening tomosynthesis    Prediabetes        Relevant Orders    Hemoglobin A1C    Routine general medical examination at health care facility        Relevant Orders    1 Year Follow Up In Primary Care - Wellness Exam

## 2024-12-12 ENCOUNTER — APPOINTMENT (OUTPATIENT)
Dept: RADIOLOGY | Facility: CLINIC | Age: 79
End: 2024-12-12
Payer: COMMERCIAL

## 2024-12-16 ENCOUNTER — HOSPITAL ENCOUNTER (OUTPATIENT)
Dept: RADIOLOGY | Facility: CLINIC | Age: 79
Discharge: HOME | End: 2024-12-16
Payer: COMMERCIAL

## 2024-12-16 VITALS — HEIGHT: 64 IN | WEIGHT: 212.08 LBS | BODY MASS INDEX: 36.21 KG/M2

## 2024-12-16 DIAGNOSIS — Z12.31 ENCOUNTER FOR SCREENING MAMMOGRAM FOR MALIGNANT NEOPLASM OF BREAST: ICD-10-CM

## 2024-12-16 PROCEDURE — 77067 SCR MAMMO BI INCL CAD: CPT | Performed by: RADIOLOGY

## 2024-12-16 PROCEDURE — 77063 BREAST TOMOSYNTHESIS BI: CPT | Performed by: RADIOLOGY

## 2024-12-16 PROCEDURE — 77067 SCR MAMMO BI INCL CAD: CPT

## 2024-12-30 ENCOUNTER — APPOINTMENT (OUTPATIENT)
Dept: RADIOLOGY | Facility: CLINIC | Age: 79
End: 2024-12-30
Payer: COMMERCIAL

## 2025-01-24 DIAGNOSIS — N39.42 URINARY INCONTINENCE WITHOUT SENSORY AWARENESS: ICD-10-CM

## 2025-05-15 ENCOUNTER — OFFICE VISIT (OUTPATIENT)
Dept: PRIMARY CARE | Facility: CLINIC | Age: 80
End: 2025-05-15
Payer: COMMERCIAL

## 2025-05-15 DIAGNOSIS — G62.0 CHEMOTHERAPY-INDUCED NEUROPATHY (MULTI): ICD-10-CM

## 2025-05-15 DIAGNOSIS — E78.5 HYPERLIPIDEMIA, UNSPECIFIED HYPERLIPIDEMIA TYPE: ICD-10-CM

## 2025-05-15 DIAGNOSIS — R26.81 GAIT INSTABILITY: ICD-10-CM

## 2025-05-15 DIAGNOSIS — M79.2 NEURALGIA: ICD-10-CM

## 2025-05-15 DIAGNOSIS — E66.01 OBESITY, MORBID (MULTI): ICD-10-CM

## 2025-05-15 DIAGNOSIS — R73.03 PRE-DIABETES: ICD-10-CM

## 2025-05-15 DIAGNOSIS — E55.9 VITAMIN D DEFICIENCY: ICD-10-CM

## 2025-05-15 DIAGNOSIS — E66.812 CLASS 2 OBESITY WITHOUT SERIOUS COMORBIDITY WITH BODY MASS INDEX (BMI) OF 36.0 TO 36.9 IN ADULT, UNSPECIFIED OBESITY TYPE: ICD-10-CM

## 2025-05-15 DIAGNOSIS — I10 HYPERTENSION, UNSPECIFIED TYPE: ICD-10-CM

## 2025-05-15 DIAGNOSIS — I10 BENIGN ESSENTIAL HTN: Primary | ICD-10-CM

## 2025-05-15 DIAGNOSIS — T45.1X5A CHEMOTHERAPY-INDUCED NEUROPATHY (MULTI): ICD-10-CM

## 2025-05-15 PROCEDURE — 36415 COLL VENOUS BLD VENIPUNCTURE: CPT | Performed by: NURSE PRACTITIONER

## 2025-05-15 PROCEDURE — 99214 OFFICE O/P EST MOD 30 MIN: CPT | Performed by: NURSE PRACTITIONER

## 2025-05-15 PROCEDURE — G2211 COMPLEX E/M VISIT ADD ON: HCPCS | Performed by: NURSE PRACTITIONER

## 2025-05-15 PROCEDURE — RXMED WILLOW AMBULATORY MEDICATION CHARGE

## 2025-05-15 RX ORDER — LOSARTAN POTASSIUM 50 MG/1
50 TABLET ORAL DAILY
Qty: 90 TABLET | Refills: 2 | Status: SHIPPED | OUTPATIENT
Start: 2025-05-15

## 2025-05-15 RX ORDER — ATORVASTATIN CALCIUM 40 MG/1
40 TABLET, FILM COATED ORAL DAILY
Qty: 100 TABLET | Refills: 3 | Status: SHIPPED | OUTPATIENT
Start: 2025-05-15 | End: 2026-06-19

## 2025-05-15 RX ORDER — AMLODIPINE BESYLATE 10 MG/1
10 TABLET ORAL DAILY
Qty: 90 TABLET | Refills: 3 | Status: SHIPPED | OUTPATIENT
Start: 2025-05-15

## 2025-05-15 ASSESSMENT — ENCOUNTER SYMPTOMS
OCCASIONAL FEELINGS OF UNSTEADINESS: 1
SHORTNESS OF BREATH: 0
FEVER: 0
BACK PAIN: 1
LOSS OF SENSATION IN FEET: 0
CHILLS: 0
DEPRESSION: 0
ARTHRALGIAS: 1
PALPITATIONS: 0
DIZZINESS: 0

## 2025-05-15 NOTE — PATIENT INSTRUCTIONS
Thank you for coming in for your visit today!    Please follow up in 4-5 months or sooner if needed.     Today we completed blood work. We will contact you with any abnormalities from this testing.    For your blood pressure:  RESTART blood pressure medication.   Medications will be mailed to you through [facility] pharmacy.   Take your medication as directed. Try to take it around the same time daily.   Keep a log of your blood pressure. Be sure to bring it with you to your next appointment so we can review it together.  Adhere to the DASH diet. This includes decreasing your salt/sodium intake. Avoid canned foods, lunch meats, and frozen foods.  Exercise for 30 minutes daily.      Call 911 or go to the emergency room if you have pain in your chest, difficulty breathing, or other life threatening symptoms.

## 2025-05-15 NOTE — PROGRESS NOTES
Subjective   Patient ID: Myah Sutherland is a 79 y.o. female who presents for Follow-up.    Pt is a 80 yo woman with PMH of HTN, arthritis, chemotherapy-induced neuropathy, mild intermittent asthma, and prediabetes presenting today for a 6-month follow-up. She has not been taking prescribed medications since her last visit, states that prescription were not sent in and she has no pharmacies nearby. Takes Tylenol prn for arthritis of the knees and back. She does not have a BP cuff at home. Ambulates with a rollator and has help from her 2 daughters with household tasks.  She has a motorized wheelchair at home, however the battery was misplaced and she was unable to find a replacement. She has an upcoming family reunion in August, would like to have working wheelchair by then. Endorses 1 asthma exacerbation in the past 3 months d/t pollen exposure, relieved with albuterol. Denies chest pain, SOB, blurred vision, abdominal pain, or constipation.          Review of Systems   Constitutional:  Negative for chills and fever.   Eyes:  Negative for visual disturbance.   Respiratory:  Negative for shortness of breath.    Cardiovascular:  Negative for chest pain and palpitations.   Musculoskeletal:  Positive for arthralgias and back pain.   Neurological:  Negative for dizziness.       Objective   There were no vitals taken for this visit.    Physical Exam  Constitutional:       General: She is not in acute distress.  Cardiovascular:      Rate and Rhythm: Normal rate and regular rhythm.      Pulses: Normal pulses.      Heart sounds: Murmur (Soft blowing diastolic murmur best heard at R upper sternal border) heard.   Pulmonary:      Effort: Pulmonary effort is normal.      Breath sounds: Normal breath sounds.   Musculoskeletal:         General: Tenderness (Bilateral lower legs and feet) present.   Skin:     General: Skin is warm and dry.   Neurological:      Mental Status: She is alert and oriented to person, place, and time.    Psychiatric:         Mood and Affect: Mood normal.         Behavior: Behavior normal.         Assessment/Plan   Diagnoses and all orders for this visit:  Benign essential HTN  -     amLODIPine (Norvasc) 10 mg tablet; Take 1 tablet (10 mg) by mouth once daily.  -     Comprehensive Metabolic Panel  -     CBC  Pre-diabetes  -     Hemoglobin A1C  Gait instability  -     General supply request motorized scooter  Vitamin D deficiency  -     Vitamin D 25-Hydroxy,Total (for eval of Vitamin D levels)  Class 2 obesity without serious comorbidity with body mass index (BMI) of 36.0 to 36.9 in adult, unspecified obesity type  Neuralgia  -     General supply request motorized scooter  Chemotherapy-induced neuropathy (Multi)  -     General supply request motorized scooter  Hypertension, unspecified type  -     losartan (Cozaar) 50 mg tablet; Take 1 tablet (50 mg) by mouth once daily.  Hyperlipidemia, unspecified hyperlipidemia type  -     atorvastatin (Lipitor) 40 mg tablet; Take 1 tablet (40 mg) by mouth once daily.    Follow up in 4-5 months or sooner if needed.    CYNTHIA Soto    I was present with the student who participated in the documentation of this note. I have personally seen and examined the patient and performed the medical decision-making components. I have reviewed the student documentation and verified the findings in the note as written with additions or exceptions as stated in the body of the note     Paz MOORE, CNP

## 2025-05-16 ENCOUNTER — PHARMACY VISIT (OUTPATIENT)
Dept: PHARMACY | Facility: CLINIC | Age: 80
End: 2025-05-16
Payer: COMMERCIAL

## 2025-05-16 LAB
25(OH)D3+25(OH)D2 SERPL-MCNC: 14 NG/ML (ref 30–100)
ALBUMIN SERPL-MCNC: 4.2 G/DL (ref 3.6–5.1)
ALP SERPL-CCNC: 79 U/L (ref 37–153)
ALT SERPL-CCNC: 12 U/L (ref 6–29)
ANION GAP SERPL CALCULATED.4IONS-SCNC: 11 MMOL/L (CALC) (ref 7–17)
AST SERPL-CCNC: 18 U/L (ref 10–35)
BILIRUB SERPL-MCNC: 0.3 MG/DL (ref 0.2–1.2)
BUN SERPL-MCNC: 21 MG/DL (ref 7–25)
CALCIUM SERPL-MCNC: 8.9 MG/DL (ref 8.6–10.4)
CHLORIDE SERPL-SCNC: 107 MMOL/L (ref 98–110)
CO2 SERPL-SCNC: 23 MMOL/L (ref 20–32)
CREAT SERPL-MCNC: 0.73 MG/DL (ref 0.6–1)
EGFRCR SERPLBLD CKD-EPI 2021: 84 ML/MIN/1.73M2
ERYTHROCYTE [DISTWIDTH] IN BLOOD BY AUTOMATED COUNT: 17.6 % (ref 11–15)
EST. AVERAGE GLUCOSE BLD GHB EST-MCNC: 123 MG/DL
EST. AVERAGE GLUCOSE BLD GHB EST-SCNC: 6.8 MMOL/L
GLUCOSE SERPL-MCNC: 81 MG/DL (ref 65–99)
HBA1C MFR BLD: 5.9 %
HCT VFR BLD AUTO: 33.5 % (ref 35–45)
HGB BLD-MCNC: 8.9 G/DL (ref 11.7–15.5)
MCH RBC QN AUTO: 21.3 PG (ref 27–33)
MCHC RBC AUTO-ENTMCNC: 26.6 G/DL (ref 32–36)
MCV RBC AUTO: 80.1 FL (ref 80–100)
PLATELET # BLD AUTO: 271 THOUSAND/UL (ref 140–400)
PMV BLD REES-ECKER: 12.1 FL (ref 7.5–12.5)
POTASSIUM SERPL-SCNC: 4.6 MMOL/L (ref 3.5–5.3)
PROT SERPL-MCNC: 7 G/DL (ref 6.1–8.1)
RBC # BLD AUTO: 4.18 MILLION/UL (ref 3.8–5.1)
SODIUM SERPL-SCNC: 141 MMOL/L (ref 135–146)
WBC # BLD AUTO: 3.6 THOUSAND/UL (ref 3.8–10.8)

## 2025-05-19 DIAGNOSIS — D64.9 ANEMIA, UNSPECIFIED TYPE: Primary | ICD-10-CM

## 2025-07-23 ENCOUNTER — LAB (OUTPATIENT)
Dept: LAB | Facility: CLINIC | Age: 80
End: 2025-07-23
Payer: COMMERCIAL

## 2025-07-23 ENCOUNTER — OFFICE VISIT (OUTPATIENT)
Dept: HEMATOLOGY/ONCOLOGY | Facility: CLINIC | Age: 80
End: 2025-07-23
Payer: COMMERCIAL

## 2025-07-23 VITALS
OXYGEN SATURATION: 96 % | HEART RATE: 77 BPM | TEMPERATURE: 97.7 F | DIASTOLIC BLOOD PRESSURE: 75 MMHG | BODY MASS INDEX: 37.14 KG/M2 | WEIGHT: 209.6 LBS | HEIGHT: 63 IN | SYSTOLIC BLOOD PRESSURE: 142 MMHG

## 2025-07-23 DIAGNOSIS — D50.8 IRON DEFICIENCY ANEMIA SECONDARY TO INADEQUATE DIETARY IRON INTAKE: Primary | ICD-10-CM

## 2025-07-23 DIAGNOSIS — I10 ESSENTIAL (PRIMARY) HYPERTENSION: ICD-10-CM

## 2025-07-23 DIAGNOSIS — D50.8 IRON DEFICIENCY ANEMIA SECONDARY TO INADEQUATE DIETARY IRON INTAKE: ICD-10-CM

## 2025-07-23 DIAGNOSIS — Z98.84 HISTORY OF ROUX-EN-Y GASTRIC BYPASS: ICD-10-CM

## 2025-07-23 LAB
ALBUMIN SERPL BCP-MCNC: 4.3 G/DL (ref 3.4–5)
ALP SERPL-CCNC: 81 U/L (ref 33–136)
ALT SERPL W P-5'-P-CCNC: 19 U/L (ref 7–45)
ANION GAP SERPL CALC-SCNC: 13 MMOL/L (ref 10–20)
AST SERPL W P-5'-P-CCNC: 23 U/L (ref 9–39)
BASOPHILS # BLD AUTO: 0.02 X10*3/UL (ref 0–0.1)
BASOPHILS NFR BLD AUTO: 0.6 %
BILIRUB SERPL-MCNC: 0.4 MG/DL (ref 0–1.2)
BUN SERPL-MCNC: 20 MG/DL (ref 6–23)
CALCIUM SERPL-MCNC: 9.5 MG/DL (ref 8.6–10.6)
CENTROMERE B AB SER-ACNC: <0.2 AI
CHLORIDE SERPL-SCNC: 106 MMOL/L (ref 98–107)
CHROMATIN AB SERPL-ACNC: 0.2 AI
CO2 SERPL-SCNC: 26 MMOL/L (ref 21–32)
CREAT SERPL-MCNC: 0.65 MG/DL (ref 0.5–1.05)
CRP SERPL-MCNC: <0.1 MG/DL
DSDNA AB SER-ACNC: <1 IU/ML
EGFRCR SERPLBLD CKD-EPI 2021: 90 ML/MIN/1.73M*2
ENA JO1 AB SER QL IA: <0.2 AI
ENA RNP AB SER IA-ACNC: <0.2 AI
ENA SCL70 AB SER QL IA: <0.2 AI
ENA SM AB SER IA-ACNC: <0.2 AI
ENA SM+RNP AB SER QL IA: <0.2 AI
ENA SS-A AB SER IA-ACNC: <0.2 AI
ENA SS-B AB SER IA-ACNC: <0.2 AI
EOSINOPHIL # BLD AUTO: 0.05 X10*3/UL (ref 0–0.4)
EOSINOPHIL NFR BLD AUTO: 1.4 %
ERYTHROCYTE [DISTWIDTH] IN BLOOD BY AUTOMATED COUNT: 18.8 % (ref 11.5–14.5)
FERRITIN SERPL-MCNC: 23 NG/ML (ref 8–150)
FOLATE SERPL-MCNC: 11.9 NG/ML
GLUCOSE SERPL-MCNC: 84 MG/DL (ref 74–99)
HAPTOGLOB SERPL NEPH-MCNC: 208 MG/DL (ref 30–200)
HCT VFR BLD AUTO: 32.4 % (ref 36–46)
HCYS SERPL-SCNC: 13.79 UMOL/L (ref 5–13.9)
HGB BLD-MCNC: 9.2 G/DL (ref 12–16)
HGB RETIC QN: 22 PG (ref 28–38)
IMM GRANULOCYTES # BLD AUTO: 0 X10*3/UL (ref 0–0.5)
IMM GRANULOCYTES NFR BLD AUTO: 0 % (ref 0–0.9)
IMMATURE RETIC FRACTION: 19.5 %
IRON SATN MFR SERPL: ABNORMAL %
IRON SERPL-MCNC: 34 UG/DL (ref 35–150)
LDH SERPL L TO P-CCNC: 215 U/L (ref 84–246)
LYMPHOCYTES # BLD AUTO: 1.51 X10*3/UL (ref 0.8–3)
LYMPHOCYTES NFR BLD AUTO: 41.8 %
MCH RBC QN AUTO: 21 PG (ref 26–34)
MCHC RBC AUTO-ENTMCNC: 28.4 G/DL (ref 32–36)
MCV RBC AUTO: 74 FL (ref 80–100)
MONOCYTES # BLD AUTO: 0.32 X10*3/UL (ref 0.05–0.8)
MONOCYTES NFR BLD AUTO: 8.9 %
NEUTROPHILS # BLD AUTO: 1.71 X10*3/UL (ref 1.6–5.5)
NEUTROPHILS NFR BLD AUTO: 47.3 %
NRBC BLD-RTO: ABNORMAL /100{WBCS}
PLATELET # BLD AUTO: 241 X10*3/UL (ref 150–450)
POTASSIUM SERPL-SCNC: 4.3 MMOL/L (ref 3.5–5.3)
PROT SERPL-MCNC: 7.8 G/DL (ref 6.4–8.2)
PROT SERPL-MCNC: 7.8 G/DL (ref 6.4–8.2)
RBC # BLD AUTO: 4.38 X10*6/UL (ref 4–5.2)
RETICS #: 0.05 X10*6/UL (ref 0.02–0.11)
RETICS/RBC NFR AUTO: 1.3 % (ref 0.5–2)
RHEUMATOID FACT SER NEPH-ACNC: <10 IU/ML (ref 0–15)
RIBOSOMAL P AB SER-ACNC: <0.2 AI
SODIUM SERPL-SCNC: 141 MMOL/L (ref 136–145)
TIBC SERPL-MCNC: ABNORMAL UG/DL
UIBC SERPL-MCNC: >450 UG/DL (ref 110–370)
VIT B12 SERPL-MCNC: >2000 PG/ML (ref 211–911)
WBC # BLD AUTO: 3.6 X10*3/UL (ref 4.4–11.3)

## 2025-07-23 PROCEDURE — 80053 COMPREHEN METABOLIC PANEL: CPT

## 2025-07-23 PROCEDURE — 1159F MED LIST DOCD IN RCRD: CPT

## 2025-07-23 PROCEDURE — 86235 NUCLEAR ANTIGEN ANTIBODY: CPT

## 2025-07-23 PROCEDURE — 99205 OFFICE O/P NEW HI 60 MIN: CPT

## 2025-07-23 PROCEDURE — 86431 RHEUMATOID FACTOR QUANT: CPT

## 2025-07-23 PROCEDURE — 83090 ASSAY OF HOMOCYSTEINE: CPT

## 2025-07-23 PROCEDURE — 1036F TOBACCO NON-USER: CPT

## 2025-07-23 PROCEDURE — 99215 OFFICE O/P EST HI 40 MIN: CPT

## 2025-07-23 PROCEDURE — 86038 ANTINUCLEAR ANTIBODIES: CPT

## 2025-07-23 PROCEDURE — 82607 VITAMIN B-12: CPT

## 2025-07-23 PROCEDURE — 83540 ASSAY OF IRON: CPT

## 2025-07-23 PROCEDURE — 86140 C-REACTIVE PROTEIN: CPT

## 2025-07-23 PROCEDURE — 84207 ASSAY OF VITAMIN B-6: CPT

## 2025-07-23 PROCEDURE — 82728 ASSAY OF FERRITIN: CPT

## 2025-07-23 PROCEDURE — 86334 IMMUNOFIX E-PHORESIS SERUM: CPT

## 2025-07-23 PROCEDURE — 3078F DIAST BP <80 MM HG: CPT

## 2025-07-23 PROCEDURE — 83615 LACTATE (LD) (LDH) ENZYME: CPT

## 2025-07-23 PROCEDURE — 36415 COLL VENOUS BLD VENIPUNCTURE: CPT

## 2025-07-23 PROCEDURE — 1125F AMNT PAIN NOTED PAIN PRSNT: CPT

## 2025-07-23 PROCEDURE — 83921 ORGANIC ACID SINGLE QUANT: CPT

## 2025-07-23 PROCEDURE — 85025 COMPLETE CBC W/AUTO DIFF WBC: CPT

## 2025-07-23 PROCEDURE — 83010 ASSAY OF HAPTOGLOBIN QUANT: CPT

## 2025-07-23 PROCEDURE — 85045 AUTOMATED RETICULOCYTE COUNT: CPT

## 2025-07-23 PROCEDURE — 83521 IG LIGHT CHAINS FREE EACH: CPT

## 2025-07-23 PROCEDURE — 1160F RVW MEDS BY RX/DR IN RCRD: CPT

## 2025-07-23 PROCEDURE — 84155 ASSAY OF PROTEIN SERUM: CPT | Mod: 59

## 2025-07-23 PROCEDURE — 3077F SYST BP >= 140 MM HG: CPT

## 2025-07-23 PROCEDURE — 82746 ASSAY OF FOLIC ACID SERUM: CPT

## 2025-07-23 ASSESSMENT — PAIN SCALES - GENERAL: PAINLEVEL_OUTOF10: 8

## 2025-07-23 NOTE — PROGRESS NOTES
"Patient ID: Myah Sutherland is a 79 y.o. female.  Referring Physician: Paz Adkins, APRN-CNP  [address]  [address]  [place] [address]  Primary Care Provider: No Assigned PCP Generic Provider, MD  Visit Type: Initial Visit      Location: [facility]   Initial consult: 2025  Reason: Anemia    79 y.o. female with a PMH significant for HTN, GERD, asthma, hyperlipidemia, uterine carcinosarcoma s/p chemo in 2014 presents for hematology consult.     Patient is not sure why she was referred to hematology, states she thought she was here for \"bone density test\".   Hx of kristopher en y gastric bypass many years ago, patient not able to recall year of surgery. She states she was prescribed ferrous sulfate, but stopped taking it due to constipation.   Review of labs shows microcytic/hypochromic anemia, hgb ranging from 8.9-10.3 since 2018 from our records. Platelets wnl. Intermittent leukopenia with 1 episode of neutrophilia and lymphopenia in 2023.  B12 low 329 in Aug 2020, folate 8.5 in . She is taking vitamin B12 1,000 mcg tabs daily    Patient denies unintentional weight loss or weight gain, abnormal bruising and bleeding, hematuria, blood in stool, dark/black stools, epistaxis, oral/gingival bleeding, lymphadenopathy, recurrent infections, recurrent fevers, night sweats, early satiety, abdominal pain, bone pain, chest pain, palpitations, SOB, BERNSTEIN, fatigue, dizziness, lightheadedness, PICA.    PSHx:  Cholecystectomy  Gastric restriction surgery  Hysterectomy    FHx:  -Mother:  from heart disease at 56, had breast cancer  -Father:  at 52 from gunshot wound  -Siblings: colon cancer, prostate cancer, bone cancer with siblings    -Children: 4 adult children   -Miscarriages: None    Social Hx:  -Occupation: Unemployed   -Marital Status:    -Alcohol Use: Quit drinking in , was a heavy drinker after     -Smoking: Former smoker, quit in   -Recreational Drug Use: Denies  -Any " "special diets: No restrictions     Screenings  -EGD: 2018 normal  -Colonoscopy: 2021 showed 2 mm polyp in the descending colon, diverticulosis in the sigmoid colon and in the; Non-bleeding external hemorrhoids. Repeat in 5 years recommended   -Mammogram: 2024 showed no mammographic evidence of malignancy.  -PAP smears: yearly with oncology team  -Lung cancer screenings: CT chest in 2024    Review of Systems:  10 point review of systems negative except as state in HPI    Objective   BSA: 2.05 meters squared  /75   Pulse 77   Temp 36.5 °C (97.7 °F)   Ht (S) 1.589 m (5' 2.56\")   Wt 95.1 kg (209 lb 9.6 oz)   SpO2 96%   BMI 37.65 kg/m²     Physical Exam  Constitutional:       Appearance: Normal appearance.      Comments: Using a rolling walker   HENT:      Head: Normocephalic.     Eyes:      Pupils: Pupils are equal, round, and reactive to light.       Cardiovascular:      Rate and Rhythm: Normal rate and regular rhythm.   Pulmonary:      Breath sounds: Normal breath sounds.   Abdominal:      General: Bowel sounds are normal. There is distension.      Palpations: Abdomen is soft.      Tenderness: There is no abdominal tenderness.     Musculoskeletal:         General: Normal range of motion.      Cervical back: Normal range of motion.   Lymphadenopathy:      Cervical: No cervical adenopathy.      Upper Body:      Right upper body: No supraclavicular adenopathy.      Left upper body: No supraclavicular adenopathy.     Skin:     General: Skin is warm and dry.     Neurological:      General: No focal deficit present.      Mental Status: She is alert and oriented to person, place, and time.     Psychiatric:         Mood and Affect: Mood normal.         Behavior: Behavior normal.       WBC   Date/Time Value Ref Range Status   11/14/2024 09:19 AM 3.3 (L) 4.4 - 11.3 x10*3/uL Final   02/09/2024 05:17 AM 3.6 (L) 4.4 - 11.3 x10*3/uL Final   02/08/2024 12:08 PM 4.3 (L) 4.4 - 11.3 x10*3/uL Final     WHITE BLOOD CELL COUNT "   Date/Time Value Ref Range Status   05/15/2025 09:07 AM 3.6 (L) 3.8 - 10.8 Thousand/uL Final     nRBC   Date Value Ref Range Status   11/14/2024 0.0 0.0 - 0.0 /100 WBCs Final   02/09/2024 0.0 0.0 - 0.0 /100 WBCs Final   02/08/2024 0.0 0.0 - 0.0 /100 WBCs Final     RBC   Date Value Ref Range Status   11/14/2024 4.34 4.00 - 5.20 x10*6/uL Final   02/09/2024 4.17 4.00 - 5.20 x10*6/uL Final   02/08/2024 4.37 4.00 - 5.20 x10*6/uL Final     RED BLOOD CELL COUNT   Date Value Ref Range Status   05/15/2025 4.18 3.80 - 5.10 Million/uL Final     Hemoglobin   Date Value Ref Range Status   11/14/2024 9.4 (L) 12.0 - 16.0 g/dL Final   02/09/2024 9.2 (L) 12.0 - 16.0 g/dL Final   02/08/2024 9.6 (L) 12.0 - 16.0 g/dL Final     HEMOGLOBIN   Date Value Ref Range Status   05/15/2025 8.9 (L) 11.7 - 15.5 g/dL Final     Hematocrit   Date Value Ref Range Status   11/14/2024 32.7 (L) 36.0 - 46.0 % Final   02/09/2024 29.8 (L) 36.0 - 46.0 % Final   02/08/2024 31.5 (L) 36.0 - 46.0 % Final     HEMATOCRIT   Date Value Ref Range Status   05/15/2025 33.5 (L) 35.0 - 45.0 % Final     MCV   Date/Time Value Ref Range Status   05/15/2025 09:07 AM 80.1 80.0 - 100.0 fL Final   11/14/2024 09:19 AM 75 (L) 80 - 100 fL Final   02/09/2024 05:17 AM 72 (L) 80 - 100 fL Final   02/08/2024 12:08 PM 72 (L) 80 - 100 fL Final     MCH   Date/Time Value Ref Range Status   05/15/2025 09:07 AM 21.3 (L) 27.0 - 33.0 pg Final   11/14/2024 09:19 AM 21.7 (L) 26.0 - 34.0 pg Final   02/09/2024 05:17 AM 22.1 (L) 26.0 - 34.0 pg Final   02/08/2024 12:08 PM 22.0 (L) 26.0 - 34.0 pg Final     MCHC   Date/Time Value Ref Range Status   05/15/2025 09:07 AM 26.6 (L) 32.0 - 36.0 g/dL Final     Comment:     For adults, a slight decrease in the calculated MCHC  value (in the range of 30 to 32 g/dL) is most likely  not clinically significant; however, it should be  interpreted with caution in correlation with other  red cell parameters and the patient's clinical  condition.     11/14/2024  09:19 AM 28.7 (L) 32.0 - 36.0 g/dL Final   02/09/2024 05:17 AM 30.9 (L) 32.0 - 36.0 g/dL Final   02/08/2024 12:08 PM 30.5 (L) 32.0 - 36.0 g/dL Final     RDW   Date/Time Value Ref Range Status   05/15/2025 09:07 AM 17.6 (H) 11.0 - 15.0 % Final   11/14/2024 09:19 AM 19.0 (H) 11.5 - 14.5 % Final   02/09/2024 05:17 AM 18.1 (H) 11.5 - 14.5 % Final   02/08/2024 12:08 PM 18.4 (H) 11.5 - 14.5 % Final     Platelets   Date/Time Value Ref Range Status   11/14/2024 09:19  150 - 450 x10*3/uL Final   02/09/2024 05:17  150 - 450 x10*3/uL Final   02/08/2024 12:08  150 - 450 x10*3/uL Final     PLATELET COUNT   Date/Time Value Ref Range Status   05/15/2025 09:07  140 - 400 Thousand/uL Final     MPV   Date/Time Value Ref Range Status   05/15/2025 09:07 AM 12.1 7.5 - 12.5 fL Final     Neutrophils %   Date/Time Value Ref Range Status   02/08/2024 12:08 PM 46.7 40.0 - 80.0 % Final   01/30/2023 09:58 AM 93.4 40.0 - 80.0 % Final   04/28/2021 08:16 AM 53.9 40.0 - 80.0 % Final   08/10/2020 09:25 AM 53.7 40.0 - 80.0 % Final     Immature Granulocytes %, Automated   Date/Time Value Ref Range Status   02/08/2024 12:08 PM 0.2 0.0 - 0.9 % Final     Comment:     Immature Granulocyte Count (IG) includes promyelocytes, myelocytes and metamyelocytes but does not include bands. Percent differential counts (%) should be interpreted in the context of the absolute cell counts (cells/UL).   01/30/2023 09:58 AM 0.3 0.0 - 0.9 % Final     Comment:      Immature Granulocyte Count (IG) includes promyelocytes,    myelocytes and metamyelocytes but does not include bands.   Percent differential counts (%) should be interpreted in the   context of the absolute cell counts (cells/L).     04/28/2021 08:16 AM 0.2 0.0 - 0.9 % Final     Comment:      Immature Granulocyte Count (IG) includes promyelocytes,    myelocytes and metamyelocytes but does not include bands.   Percent differential counts (%) should be interpreted in the   context of the  absolute cell counts (cells/L).     08/10/2020 09:25 AM 0.2 0.0 - 0.9 % Final     Comment:      Immature Granulocyte Count (IG) includes promyelocytes,    myelocytes and metamyelocytes but does not include bands.   Percent differential counts (%) should be interpreted in the   context of the absolute cell counts (cells/L).       Lymphocytes %   Date/Time Value Ref Range Status   02/08/2024 12:08 PM 41.6 13.0 - 44.0 % Final   01/30/2023 09:58 AM 4.1 13.0 - 44.0 % Final   04/28/2021 08:16 AM 31.3 13.0 - 44.0 % Final   08/10/2020 09:25 AM 35.1 13.0 - 44.0 % Final     Monocytes %   Date/Time Value Ref Range Status   02/08/2024 12:08 PM 9.8 2.0 - 10.0 % Final   01/30/2023 09:58 AM 2.1 2.0 - 10.0 % Final   04/28/2021 08:16 AM 12.7 2.0 - 10.0 % Final   08/10/2020 09:25 AM 9.7 2.0 - 10.0 % Final     Eosinophils %   Date/Time Value Ref Range Status   02/08/2024 12:08 PM 1.2 0.0 - 6.0 % Final   01/30/2023 09:58 AM 0.0 0.0 - 6.0 % Final   04/28/2021 08:16 AM 1.4 0.0 - 6.0 % Final   08/10/2020 09:25 AM 1.1 0.0 - 6.0 % Final     Basophils %   Date/Time Value Ref Range Status   02/08/2024 12:08 PM 0.5 0.0 - 2.0 % Final   01/30/2023 09:58 AM 0.1 0.0 - 2.0 % Final   04/28/2021 08:16 AM 0.5 0.0 - 2.0 % Final   08/10/2020 09:25 AM 0.2 0.0 - 2.0 % Final     Neutrophils Absolute   Date/Time Value Ref Range Status   02/08/2024 12:08 PM 2.00 1.60 - 5.50 x10*3/uL Final     Comment:     Percent differential counts (%) should be interpreted in the context of the absolute cell counts (cells/uL).   01/30/2023 09:58 AM 8.84 (H) 1.60 - 5.50 x10E9/L Final   04/28/2021 08:16 AM 2.24 1.60 - 5.50 x10E9/L Final   08/10/2020 09:25 AM 2.37 1.60 - 5.50 x10E9/L Final     Immature Granulocytes Absolute, Automated   Date/Time Value Ref Range Status   02/08/2024 12:08 PM 0.01 0.00 - 0.50 x10*3/uL Final     Lymphocytes Absolute   Date/Time Value Ref Range Status   02/08/2024 12:08 PM 1.78 0.80 - 3.00 x10*3/uL Final   01/30/2023 09:58 AM 0.39 (L) 0.80 - 3.00  x10E9/L Final   04/28/2021 08:16 AM 1.30 0.80 - 3.00 x10E9/L Final   08/10/2020 09:25 AM 1.55 0.80 - 3.00 x10E9/L Final     Monocytes Absolute   Date/Time Value Ref Range Status   02/08/2024 12:08 PM 0.42 0.05 - 0.80 x10*3/uL Final   01/30/2023 09:58 AM 0.20 0.05 - 0.80 x10E9/L Final   04/28/2021 08:16 AM 0.53 0.05 - 0.80 x10E9/L Final   08/10/2020 09:25 AM 0.43 0.05 - 0.80 x10E9/L Final     Eosinophils Absolute   Date/Time Value Ref Range Status   02/08/2024 12:08 PM 0.05 0.00 - 0.40 x10*3/uL Final   01/30/2023 09:58 AM 0.00 0.00 - 0.40 x10E9/L Final   04/28/2021 08:16 AM 0.06 0.00 - 0.40 x10E9/L Final   08/10/2020 09:25 AM 0.05 0.00 - 0.40 x10E9/L Final     Basophils Absolute   Date/Time Value Ref Range Status   02/08/2024 12:08 PM 0.02 0.00 - 0.10 x10*3/uL Final   01/30/2023 09:58 AM 0.01 0.00 - 0.10 x10E9/L Final   04/28/2021 08:16 AM 0.02 0.00 - 0.10 x10E9/L Final   08/10/2020 09:25 AM 0.01 0.00 - 0.10 x10E9/L Final     Assessment/Plan    79 y.o. female with a PMH significant for HTN, GERD, asthma, hyperlipidemia, uterine carcinosarcoma s/p chemo in 5/2014 presents for hematology consul.     # Anemia   Hx of kristopher en y gastric bypass many years ago, patient not able to recall year of surgery. She was on ferrous sulfate, but stopped taking it due to constipation.   Review of labs shows microcytic/hypochromic anemia, hgb ranging from 8.9-10.3 since Sept 2018 from our records. Platelets wnl. Intermittent leukopenia with 1 episode of neutrophilia and lymphopenia in Jan 2023.  B12 low 329 in Aug 2020, folate 8.5 in 2018. She is taking vitamin B12 1,000 mcg tabs daily  Colonoscopy in 2021 showed polyp, diverticulosis and non-bleeding external hemorrhoids.  CT abd pelvis in 2023 showed no evidence of disease, no liver disease or splenomegaly    Plan:  - Labs today: CBC/d, CMP, B12/folate, MMA and homocysteine, iron panel, hemolysis lab, myeloma lab, autoimmune lab  -Will call with labs results   -Will start on ferrous  gluconate after lab result   - Follow up next: 3 months  - Labs prior to follow up:  CBC/d, CMP, B12/folate, iron panel          OSCAR Jasso-CNP

## 2025-07-24 LAB
ANA PATTERN: ABNORMAL
ANA SER QL HEP2 SUBST: POSITIVE
ANA TITR SER IF: ABNORMAL {TITER}
KAPPA LC SERPL-MCNC: 1.55 MG/DL (ref 0.33–1.94)
KAPPA LC/LAMBDA SER: 1.44 {RATIO} (ref 0.26–1.65)
LAMBDA LC SERPL-MCNC: 1.08 MG/DL (ref 0.57–2.63)

## 2025-07-27 LAB — PYRIDOXAL PHOS SERPL-SCNC: 25.4 NMOL/L (ref 20–125)

## 2025-07-28 LAB
ALBUMIN: 4.4 G/DL (ref 3.4–5)
ALPHA 1 GLOBULIN: 0.3 G/DL (ref 0.2–0.6)
ALPHA 2 GLOBULIN: 0.8 G/DL (ref 0.4–1.1)
BETA GLOBULIN: 1 G/DL (ref 0.5–1.2)
GAMMA GLOBULIN: 1.2 G/DL (ref 0.5–1.4)
IMMUNOFIXATION COMMENT: ABNORMAL
M-PROTEIN 1: 0.3 G/DL (ref ?–0)
PATH REVIEW - SERUM IMMUNOFIXATION: ABNORMAL
PATH REVIEW-SERUM PROTEIN ELECTROPHORESIS: ABNORMAL
PROTEIN ELECTROPHORESIS COMMENT: ABNORMAL

## 2025-07-29 LAB — METHYLMALONATE SERPL-SCNC: 0.17 UMOL/L (ref 0–0.4)

## 2025-07-31 ENCOUNTER — APPOINTMENT (OUTPATIENT)
Dept: GYNECOLOGIC ONCOLOGY | Facility: CLINIC | Age: 80
End: 2025-07-31
Payer: COMMERCIAL

## 2025-08-07 ENCOUNTER — PHARMACY VISIT (OUTPATIENT)
Dept: PHARMACY | Facility: CLINIC | Age: 80
End: 2025-08-07
Payer: COMMERCIAL

## 2025-08-07 PROCEDURE — RXMED WILLOW AMBULATORY MEDICATION CHARGE

## 2025-08-14 ENCOUNTER — OFFICE VISIT (OUTPATIENT)
Dept: GYNECOLOGIC ONCOLOGY | Facility: CLINIC | Age: 80
End: 2025-08-14
Payer: COMMERCIAL

## 2025-08-14 VITALS
DIASTOLIC BLOOD PRESSURE: 71 MMHG | SYSTOLIC BLOOD PRESSURE: 131 MMHG | TEMPERATURE: 98.6 F | OXYGEN SATURATION: 98 % | WEIGHT: 208.7 LBS | BODY MASS INDEX: 37.49 KG/M2 | HEART RATE: 84 BPM

## 2025-08-14 DIAGNOSIS — C54.1 ENDOMETRIAL CANCER (MULTI): Primary | ICD-10-CM

## 2025-08-14 DIAGNOSIS — Z08 ENCOUNTER FOR FOLLOW-UP SURVEILLANCE OF CANCER OF FEMALE GENITAL TRACT ORGAN: ICD-10-CM

## 2025-08-14 DIAGNOSIS — Z85.40 ENCOUNTER FOR FOLLOW-UP SURVEILLANCE OF CANCER OF FEMALE GENITAL TRACT ORGAN: ICD-10-CM

## 2025-08-14 PROCEDURE — 1159F MED LIST DOCD IN RCRD: CPT | Performed by: NURSE PRACTITIONER

## 2025-08-14 PROCEDURE — 3075F SYST BP GE 130 - 139MM HG: CPT | Performed by: NURSE PRACTITIONER

## 2025-08-14 PROCEDURE — 3078F DIAST BP <80 MM HG: CPT | Performed by: NURSE PRACTITIONER

## 2025-08-14 PROCEDURE — 99214 OFFICE O/P EST MOD 30 MIN: CPT | Performed by: NURSE PRACTITIONER

## 2025-08-14 PROCEDURE — G2211 COMPLEX E/M VISIT ADD ON: HCPCS | Performed by: NURSE PRACTITIONER

## 2025-08-14 PROCEDURE — 1125F AMNT PAIN NOTED PAIN PRSNT: CPT | Performed by: NURSE PRACTITIONER

## 2025-08-14 ASSESSMENT — PAIN SCALES - GENERAL: PAINLEVEL_OUTOF10: 8

## 2025-08-21 PROCEDURE — RXMED WILLOW AMBULATORY MEDICATION CHARGE

## 2025-08-22 ENCOUNTER — PHARMACY VISIT (OUTPATIENT)
Dept: PHARMACY | Facility: CLINIC | Age: 80
End: 2025-08-22
Payer: COMMERCIAL

## 2025-12-01 ENCOUNTER — APPOINTMENT (OUTPATIENT)
Dept: HEMATOLOGY/ONCOLOGY | Facility: CLINIC | Age: 80
End: 2025-12-01
Payer: COMMERCIAL